# Patient Record
Sex: FEMALE | Race: BLACK OR AFRICAN AMERICAN | Employment: UNEMPLOYED | URBAN - METROPOLITAN AREA
[De-identification: names, ages, dates, MRNs, and addresses within clinical notes are randomized per-mention and may not be internally consistent; named-entity substitution may affect disease eponyms.]

---

## 2018-05-16 LAB
CHLAMYDIA, EXTERNAL: NEGATIVE
HBSAG, EXTERNAL: NEGATIVE
HIV, EXTERNAL: NEGATIVE
N. GONORRHEA, EXTERNAL: NEGATIVE
RUBELLA, EXTERNAL: NORMAL
TYPE, ABO & RH, EXTERNAL: NORMAL

## 2018-09-02 LAB — GRBS, EXTERNAL: NEGATIVE

## 2018-10-09 ENCOUNTER — ANESTHESIA (OUTPATIENT)
Dept: LABOR AND DELIVERY | Age: 33
DRG: 541 | End: 2018-10-09
Payer: MEDICAID

## 2018-10-09 ENCOUNTER — HOSPITAL ENCOUNTER (INPATIENT)
Age: 33
LOS: 3 days | Discharge: HOME | DRG: 541 | End: 2018-10-12
Attending: OBSTETRICS & GYNECOLOGY | Admitting: OBSTETRICS & GYNECOLOGY
Payer: MEDICAID

## 2018-10-09 ENCOUNTER — ANESTHESIA EVENT (OUTPATIENT)
Dept: LABOR AND DELIVERY | Age: 33
DRG: 541 | End: 2018-10-09
Payer: MEDICAID

## 2018-10-09 PROBLEM — O36.8190 DECREASED FETAL MOVEMENT: Status: ACTIVE | Noted: 2018-10-09

## 2018-10-09 PROBLEM — Z33.1 IUP (INTRAUTERINE PREGNANCY), INCIDENTAL: Status: ACTIVE | Noted: 2018-10-09

## 2018-10-09 PROBLEM — O41.00X0 OLIGOHYDRAMNIOS: Status: ACTIVE | Noted: 2018-10-09

## 2018-10-09 LAB
BASOPHILS # BLD: 0 K/UL (ref 0–0.1)
BASOPHILS NFR BLD: 0 % (ref 0–2)
DIFFERENTIAL METHOD BLD: ABNORMAL
EOSINOPHIL # BLD: 0.1 K/UL (ref 0–0.4)
EOSINOPHIL NFR BLD: 1 % (ref 0–5)
ERYTHROCYTE [DISTWIDTH] IN BLOOD BY AUTOMATED COUNT: 16.4 % (ref 11.6–14.5)
HCT VFR BLD AUTO: 29 % (ref 35–45)
HGB BLD-MCNC: 9 G/DL (ref 12–16)
LYMPHOCYTES # BLD: 1.4 K/UL (ref 0.9–3.6)
LYMPHOCYTES NFR BLD: 17 % (ref 21–52)
MCH RBC QN AUTO: 21.2 PG (ref 24–34)
MCHC RBC AUTO-ENTMCNC: 31 G/DL (ref 31–37)
MCV RBC AUTO: 68.4 FL (ref 74–97)
MONOCYTES # BLD: 0.7 K/UL (ref 0.05–1.2)
MONOCYTES NFR BLD: 9 % (ref 3–10)
NEUTS SEG # BLD: 5.9 K/UL (ref 1.8–8)
NEUTS SEG NFR BLD: 73 % (ref 40–73)
PLATELET # BLD AUTO: 284 K/UL (ref 135–420)
PMV BLD AUTO: 10.5 FL (ref 9.2–11.8)
RBC # BLD AUTO: 4.24 M/UL (ref 4.2–5.3)
WBC # BLD AUTO: 8 K/UL (ref 4.6–13.2)

## 2018-10-09 PROCEDURE — 00HU33Z INSERTION OF INFUSION DEVICE INTO SPINAL CANAL, PERCUTANEOUS APPROACH: ICD-10-PCS | Performed by: ANESTHESIOLOGY

## 2018-10-09 PROCEDURE — 65270000029 HC RM PRIVATE

## 2018-10-09 PROCEDURE — 74011250636 HC RX REV CODE- 250/636

## 2018-10-09 PROCEDURE — 86900 BLOOD TYPING SEROLOGIC ABO: CPT | Performed by: ADVANCED PRACTICE MIDWIFE

## 2018-10-09 PROCEDURE — 75410000000 HC DELIVERY VAGINAL/SINGLE

## 2018-10-09 PROCEDURE — 74011250636 HC RX REV CODE- 250/636: Performed by: ADVANCED PRACTICE MIDWIFE

## 2018-10-09 PROCEDURE — 10907ZC DRAINAGE OF AMNIOTIC FLUID, THERAPEUTIC FROM PRODUCTS OF CONCEPTION, VIA NATURAL OR ARTIFICIAL OPENING: ICD-10-PCS | Performed by: OBSTETRICS & GYNECOLOGY

## 2018-10-09 PROCEDURE — 74011000250 HC RX REV CODE- 250

## 2018-10-09 PROCEDURE — 76060000078 HC EPIDURAL ANESTHESIA

## 2018-10-09 PROCEDURE — 77030007879 HC KT SPN EPDRL TELE -B: Performed by: ANESTHESIOLOGY

## 2018-10-09 PROCEDURE — 75410000003 HC RECOV DEL/VAG/CSECN EA 0.5 HR

## 2018-10-09 PROCEDURE — 75410000002 HC LABOR FEE PER 1 HR

## 2018-10-09 PROCEDURE — 59025 FETAL NON-STRESS TEST: CPT

## 2018-10-09 PROCEDURE — 86920 COMPATIBILITY TEST SPIN: CPT | Performed by: ADVANCED PRACTICE MIDWIFE

## 2018-10-09 PROCEDURE — 36415 COLL VENOUS BLD VENIPUNCTURE: CPT | Performed by: ADVANCED PRACTICE MIDWIFE

## 2018-10-09 PROCEDURE — 74011250637 HC RX REV CODE- 250/637: Performed by: ADVANCED PRACTICE MIDWIFE

## 2018-10-09 PROCEDURE — 3E0R3BZ INTRODUCTION OF ANESTHETIC AGENT INTO SPINAL CANAL, PERCUTANEOUS APPROACH: ICD-10-PCS | Performed by: ANESTHESIOLOGY

## 2018-10-09 PROCEDURE — 85025 COMPLETE CBC W/AUTO DIFF WBC: CPT | Performed by: ADVANCED PRACTICE MIDWIFE

## 2018-10-09 PROCEDURE — 3E033VJ INTRODUCTION OF OTHER HORMONE INTO PERIPHERAL VEIN, PERCUTANEOUS APPROACH: ICD-10-PCS | Performed by: OBSTETRICS & GYNECOLOGY

## 2018-10-09 RX ORDER — OXYTOCIN/0.9 % SODIUM CHLORIDE 30/500 ML
0-20 PLASTIC BAG, INJECTION (ML) INTRAVENOUS
Status: DISCONTINUED | OUTPATIENT
Start: 2018-10-09 | End: 2018-10-11

## 2018-10-09 RX ORDER — PROMETHAZINE HYDROCHLORIDE 25 MG/ML
25 INJECTION, SOLUTION INTRAMUSCULAR; INTRAVENOUS
Status: DISCONTINUED | OUTPATIENT
Start: 2018-10-09 | End: 2018-10-12 | Stop reason: HOSPADM

## 2018-10-09 RX ORDER — LIDOCAINE HYDROCHLORIDE AND EPINEPHRINE 15; 5 MG/ML; UG/ML
INJECTION, SOLUTION EPIDURAL AS NEEDED
Status: DISCONTINUED | OUTPATIENT
Start: 2018-10-09 | End: 2018-10-10 | Stop reason: HOSPADM

## 2018-10-09 RX ORDER — AMOXICILLIN 250 MG
1 CAPSULE ORAL
Status: DISCONTINUED | OUTPATIENT
Start: 2018-10-09 | End: 2018-10-12 | Stop reason: HOSPADM

## 2018-10-09 RX ORDER — OXYTOCIN/RINGER'S LACTATE 20/1000 ML
500 PLASTIC BAG, INJECTION (ML) INTRAVENOUS ONCE
Status: ACTIVE | OUTPATIENT
Start: 2018-10-09 | End: 2018-10-10

## 2018-10-09 RX ORDER — FENTANYL CITRATE 50 UG/ML
INJECTION, SOLUTION INTRAMUSCULAR; INTRAVENOUS
Status: COMPLETED
Start: 2018-10-09 | End: 2018-10-09

## 2018-10-09 RX ORDER — NALBUPHINE HYDROCHLORIDE 10 MG/ML
10 INJECTION, SOLUTION INTRAMUSCULAR; INTRAVENOUS; SUBCUTANEOUS
Status: DISCONTINUED | OUTPATIENT
Start: 2018-10-09 | End: 2018-10-10 | Stop reason: HOSPADM

## 2018-10-09 RX ORDER — ACETAMINOPHEN 325 MG/1
650 TABLET ORAL
Status: DISCONTINUED | OUTPATIENT
Start: 2018-10-09 | End: 2018-10-12 | Stop reason: HOSPADM

## 2018-10-09 RX ORDER — FENTANYL/ROPIVACAINE/NS/PF 2MCG/ML-.1
PLASTIC BAG, INJECTION (ML) EPIDURAL
Status: COMPLETED
Start: 2018-10-09 | End: 2018-10-09

## 2018-10-09 RX ORDER — SODIUM CHLORIDE, SODIUM LACTATE, POTASSIUM CHLORIDE, CALCIUM CHLORIDE 600; 310; 30; 20 MG/100ML; MG/100ML; MG/100ML; MG/100ML
125 INJECTION, SOLUTION INTRAVENOUS CONTINUOUS
Status: DISCONTINUED | OUTPATIENT
Start: 2018-10-09 | End: 2018-10-10 | Stop reason: HOSPADM

## 2018-10-09 RX ORDER — TERBUTALINE SULFATE 1 MG/ML
INJECTION SUBCUTANEOUS
Status: COMPLETED
Start: 2018-10-09 | End: 2018-10-09

## 2018-10-09 RX ORDER — FENTANYL CITRATE 50 UG/ML
100 INJECTION, SOLUTION INTRAMUSCULAR; INTRAVENOUS ONCE
Status: DISCONTINUED | OUTPATIENT
Start: 2018-10-09 | End: 2018-10-10 | Stop reason: HOSPADM

## 2018-10-09 RX ORDER — OXYTOCIN/RINGER'S LACTATE 20/1000 ML
PLASTIC BAG, INJECTION (ML) INTRAVENOUS
Status: DISPENSED
Start: 2018-10-09 | End: 2018-10-10

## 2018-10-09 RX ORDER — BUPIVACAINE HYDROCHLORIDE 2.5 MG/ML
INJECTION, SOLUTION EPIDURAL; INFILTRATION; INTRACAUDAL AS NEEDED
Status: DISCONTINUED | OUTPATIENT
Start: 2018-10-09 | End: 2018-10-10 | Stop reason: HOSPADM

## 2018-10-09 RX ORDER — LIDOCAINE HYDROCHLORIDE 10 MG/ML
20 INJECTION, SOLUTION EPIDURAL; INFILTRATION; INTRACAUDAL; PERINEURAL AS NEEDED
Status: DISCONTINUED | OUTPATIENT
Start: 2018-10-09 | End: 2018-10-10 | Stop reason: HOSPADM

## 2018-10-09 RX ORDER — FENTANYL/ROPIVACAINE/NS/PF 2MCG/ML-.1
1-15 PLASTIC BAG, INJECTION (ML) EPIDURAL
Status: DISCONTINUED | OUTPATIENT
Start: 2018-10-09 | End: 2018-10-10 | Stop reason: HOSPADM

## 2018-10-09 RX ORDER — PHENYLEPHRINE HCL IN 0.9% NACL 1 MG/10 ML
80 SYRINGE (ML) INTRAVENOUS AS NEEDED
Status: DISCONTINUED | OUTPATIENT
Start: 2018-10-09 | End: 2018-10-10 | Stop reason: HOSPADM

## 2018-10-09 RX ORDER — FENTANYL CITRATE 50 UG/ML
INJECTION, SOLUTION INTRAMUSCULAR; INTRAVENOUS AS NEEDED
Status: DISCONTINUED | OUTPATIENT
Start: 2018-10-09 | End: 2018-10-10 | Stop reason: HOSPADM

## 2018-10-09 RX ORDER — OXYTOCIN/RINGER'S LACTATE 20/1000 ML
125 PLASTIC BAG, INJECTION (ML) INTRAVENOUS CONTINUOUS
Status: DISCONTINUED | OUTPATIENT
Start: 2018-10-09 | End: 2018-10-10 | Stop reason: HOSPADM

## 2018-10-09 RX ORDER — BUTORPHANOL TARTRATE 1 MG/ML
2 INJECTION INTRAMUSCULAR; INTRAVENOUS
Status: DISCONTINUED | OUTPATIENT
Start: 2018-10-09 | End: 2018-10-10 | Stop reason: HOSPADM

## 2018-10-09 RX ORDER — MINERAL OIL
30 OIL (ML) ORAL AS NEEDED
Status: DISCONTINUED | OUTPATIENT
Start: 2018-10-09 | End: 2018-10-10 | Stop reason: HOSPADM

## 2018-10-09 RX ORDER — ZOLPIDEM TARTRATE 5 MG/1
5 TABLET ORAL
Status: DISCONTINUED | OUTPATIENT
Start: 2018-10-09 | End: 2018-10-12 | Stop reason: HOSPADM

## 2018-10-09 RX ORDER — METHYLERGONOVINE MALEATE 0.2 MG/ML
0.2 INJECTION INTRAVENOUS AS NEEDED
Status: DISCONTINUED | OUTPATIENT
Start: 2018-10-09 | End: 2018-10-10 | Stop reason: HOSPADM

## 2018-10-09 RX ORDER — TERBUTALINE SULFATE 1 MG/ML
0.25 INJECTION SUBCUTANEOUS
Status: DISCONTINUED | OUTPATIENT
Start: 2018-10-09 | End: 2018-10-10 | Stop reason: HOSPADM

## 2018-10-09 RX ORDER — LIDOCAINE HYDROCHLORIDE 10 MG/ML
INJECTION INFILTRATION; PERINEURAL
Status: DISPENSED
Start: 2018-10-09 | End: 2018-10-10

## 2018-10-09 RX ORDER — RANITIDINE 150 MG/1
150 TABLET, FILM COATED ORAL 2 TIMES DAILY
COMMUNITY

## 2018-10-09 RX ORDER — SODIUM CHLORIDE 0.9 % (FLUSH) 0.9 %
5-10 SYRINGE (ML) INJECTION AS NEEDED
Status: DISCONTINUED | OUTPATIENT
Start: 2018-10-09 | End: 2018-10-10 | Stop reason: HOSPADM

## 2018-10-09 RX ORDER — IBUPROFEN 400 MG/1
800 TABLET ORAL EVERY 8 HOURS
Status: DISCONTINUED | OUTPATIENT
Start: 2018-10-09 | End: 2018-10-12 | Stop reason: HOSPADM

## 2018-10-09 RX ORDER — NALOXONE HYDROCHLORIDE 0.4 MG/ML
0.2 INJECTION, SOLUTION INTRAMUSCULAR; INTRAVENOUS; SUBCUTANEOUS AS NEEDED
Status: DISCONTINUED | OUTPATIENT
Start: 2018-10-09 | End: 2018-10-10 | Stop reason: HOSPADM

## 2018-10-09 RX ORDER — SODIUM CHLORIDE 0.9 % (FLUSH) 0.9 %
5-10 SYRINGE (ML) INJECTION EVERY 8 HOURS
Status: DISCONTINUED | OUTPATIENT
Start: 2018-10-09 | End: 2018-10-10 | Stop reason: HOSPADM

## 2018-10-09 RX ORDER — LIDOCAINE HYDROCHLORIDE 10 MG/ML
INJECTION INFILTRATION; PERINEURAL AS NEEDED
Status: DISCONTINUED | OUTPATIENT
Start: 2018-10-09 | End: 2018-10-10 | Stop reason: HOSPADM

## 2018-10-09 RX ADMIN — Medication 2 MILLI-UNITS/MIN: at 16:30

## 2018-10-09 RX ADMIN — FENTANYL CITRATE 100 MCG: 50 INJECTION, SOLUTION INTRAMUSCULAR; INTRAVENOUS at 19:23

## 2018-10-09 RX ADMIN — BUPIVACAINE HYDROCHLORIDE 8 ML: 2.5 INJECTION, SOLUTION EPIDURAL; INFILTRATION; INTRACAUDAL at 19:21

## 2018-10-09 RX ADMIN — TERBUTALINE SULFATE 0.25 MG: 1 INJECTION SUBCUTANEOUS at 22:15

## 2018-10-09 RX ADMIN — LIDOCAINE HYDROCHLORIDE 3 ML: 10 INJECTION INFILTRATION; PERINEURAL at 19:19

## 2018-10-09 RX ADMIN — IBUPROFEN 800 MG: 400 TABLET ORAL at 23:11

## 2018-10-09 RX ADMIN — BUTORPHANOL TARTRATE 2 MG: 1 INJECTION, SOLUTION INTRAMUSCULAR; INTRAVENOUS at 16:49

## 2018-10-09 RX ADMIN — Medication 10 ML/HR: at 19:25

## 2018-10-09 RX ADMIN — LIDOCAINE HYDROCHLORIDE AND EPINEPHRINE 3 ML: 15; 5 INJECTION, SOLUTION EPIDURAL at 19:23

## 2018-10-09 RX ADMIN — ROPIVACAINE HYDROCHLORIDE 10 ML/HR: 10 INJECTION, SOLUTION EPIDURAL at 19:25

## 2018-10-09 RX ADMIN — SODIUM CHLORIDE, SODIUM LACTATE, POTASSIUM CHLORIDE, AND CALCIUM CHLORIDE 125 ML/HR: 600; 310; 30; 20 INJECTION, SOLUTION INTRAVENOUS at 23:13

## 2018-10-09 NOTE — PROGRESS NOTES
1545 Bedside and Verbal shift change report given to ALISTAIR Oden RN (oncoming nurse) by Grant Cao (offgoing nurse). Report included the following information SBAR, Kardex, Procedure Summary, Intake/Output, MAR, Recent Results and Med Rec Status. 1849 Pt request for epidural. IVF bolusing. Supplies gathered. 1904 Dr Monica Nj paged and returned call. He will be here shortly. 1910 Bedside and Verbal shift change report given to OLIVERIO Morales RN (oncoming nurse) by Zainab Fitzgerald RN  (offgoing nurse). Report included the following information SBAR, Kardex, Procedure Summary, Intake/Output, MAR and Recent Results.

## 2018-10-09 NOTE — IP AVS SNAPSHOT
[address] [place] [address] 
[phone] Patient: Dionicio Galarza MRN: [id number] :1985 A check elsie indicates which time of day the medication should be taken. My Medications START taking these medications Instructions Each Dose to Equal  
 Morning Noon Evening Bedtime  
 docusate sodium 100 mg capsule Commonly known as:  Rometta Ohms Your last dose was: Your next dose is: Take 1 Cap by mouth two (2) times daily as needed for Constipation for up to 90 days. 100 mg  
    
   
   
   
  
 ferrous sulfate 325 mg (65 mg iron) EC tablet Commonly known as:  IRON Your last dose was: Your next dose is: Take 1 Tab by mouth Before breakfast and dinner for 90 days. 325 mg  
    
   
   
   
  
 ibuprofen 800 mg tablet Commonly known as:  MOTRIN Your last dose was: Your next dose is: Take 1 Tab by mouth every eight (8) hours. Indications: Pain 800 mg CONTINUE taking these medications Instructions Each Dose to Equal  
 Morning Noon Evening Bedtime PRENATAL #2 PO Your last dose was: Your next dose is: Take 1 Tab by mouth daily. 1 Tab ZANTAC 150 mg tablet Generic drug:  raNITIdine Your last dose was: Your next dose is: Take 150 mg by mouth two (2) times a day. 150 mg Where to Get Your Medications Information on where to get these meds will be given to you by the nurse or doctor. ! Ask your nurse or doctor about these medications  
  docusate sodium 100 mg capsule  
 ferrous sulfate 325 mg (65 mg iron) EC tablet  
 ibuprofen 800 mg tablet

## 2018-10-09 NOTE — PROGRESS NOTES
191 Bedside and Verbal shift change report given to REGULO Morales RN  (oncoming nurse) by Hortensia Almeida RN  (offgoing nurse). Report included the following information SBAR, Kardex, Intake/Output, MAR and Recent Results.  Dr. Buster Acosta at bedside for epidural placement. Procedure explained to include risks and benefits. Verbalizes understanding. All questions answered.  Sitting up on side of bed. Position reviewed. Time out 
 
 Epidural placed 192oading dosed. Fentanyl 100 mcg given by Dr. Buster Acosta  Test dose  Assisted back to bed after epidural placement. Tolerated procedure well. Vital signs stable. Monitors adjusted.  Head to toe assessment complete. Denies headache, blurry vision, floaters or epigastric pain. Pain 3/10 after epidural. VSS.  SVE 4/80/-2.  
 
 Text page recent SVE 4/80/-2 to DARLING Jain.  Patient assisted to right lateral side with peanut ball between legs. Call light within reach.  Variables noted. O2 applied, IV bolus given, left lateral side.  Text page recent SVE 5-6/90/-2 and various variable decelerations to DARLING Jain via Rebecca.  CNM called unit. CNM reviewed strip. Orders to stop pitocin.  Straight cath for 75ml of urine.  CNM called unit, on her way to unit.  Hands and knees. 215 CNM at bedside. 215 Bedpan attempt successful 200ml.  
 
2200 SVE 8/100/+1 
 
2206 Hands and knees. 220 Patient complete. Started pushing. [place] 200 monitored continuously by CNM and RN. 221 Dr. Yana Kohler paged to review strip. 2215 Terbutaline 0.25 mgadministered. 2218 Started pushing on hands and knees. 2219  of viable infant boy. Patient delivered on hands and knees. Umbilical cord cut by FOB under CNM supervision. Infant taken to [facility] to be assessed by Nael.  
 
223 Placenta delivered. 2245 Parker care complete. Clean bed pad, parker pad and ice pack provided. Firm @ U, small [facility].  
 
2308 PP assessment complete. Reports mild headache, no blurry vision no floaters. Pain 6/10 requesting pain medication. VSS.  
 
2311 Motrin 800mg administered. Pain 6/10. 
 
0009 Zofran 4 mg IV administered. 4654 Patient ambulatory to bathroom. Voided 500ml. Parker care complete. Clean parker pad, ice pack and gown provided. Tolerated well. 6387 Patient taken to NICU to see infant son via w/c. 
 
0125 Patient back in room. 0128 Up to bathroom. Voided. Tolerated well. 8621 Tylenol 650 administered. 0200 Patient set up with a breast pump.   
 
0300 Patient sleeping. No needs at this time. Call light within reach. 0410 Infant back in room with patient. 5320 Patient ambulatory to bathroom. Voided. Assisted with parker care. Tolerated well.  
 
0715 Bedside and Verbal shift change report given to Maggie Santos RN (oncoming nurse) by REGULO Pearson RN (offgoing nurse). Report included the following information SBAR, Kardex, Intake/Output, MAR and Recent Results.

## 2018-10-09 NOTE — PROGRESS NOTES
1327  @ 40.3 weeks arrived from office for decreased FM and r/o labor. To bathroom to void, ambulated to bed and connected to EFM. 454 1167 Pt reports active movement at this time, and request to eat. 1415 Pt sitting up in bed eating. 1121 [facility] Desiree Diaz called, reviewed NST and SVE. Will be on unit to perform ultrasound for KENDRA. 1528 DARLING Diaz at bedside doing ultrasound. 1545 Bedside shift change report given to ANNIE Oden RN (oncoming nurse) by Hoa Fink RN 
 (offgoing nurse). Report included the following information SBAR, Kardex and MAR.

## 2018-10-09 NOTE — IP AVS SNAPSHOT
[address] [address] 
[phone] Patient: Bhupinder Piper MRN: [id number] :1985 About your hospitalization You were admitted on:  2018 You last received care in the:  [address] [place] [address] You were discharged on:  2018 Why you were hospitalized Your primary diagnosis was:  Not on File Your diagnoses also included:  Decreased Fetal Movement, Oligohydramnios, Iup (Intrauterine Pregnancy), Incidental, Postpartum Hemorrhage, Delayed (> 24 Hrs), S/P Dilation And Curettage, Spontaneous Vaginal Delivery, Anemia Follow-up Information Follow up With Details Comments Contact Info None   None ([phone]) Patient stated that they have no PCP Burak Craig MD In 6 weeks Call your doctors office to schedule an appt for 6 weeks postpartum check. 2204 [facility] [address] [place] [address] 
[phone] Discharge Orders None A check elsie indicates which time of day the medication should be taken. My Medications START taking these medications Instructions Each Dose to Equal  
 Morning Noon Evening Bedtime  
 docusate sodium 100 mg capsule Commonly known as:  Clemenciacrissy Foster Your last dose was: Your next dose is: Take 1 Cap by mouth two (2) times daily as needed for Constipation for up to 90 days. 100 mg  
    
   
   
   
  
 ferrous sulfate 325 mg (65 mg iron) EC tablet Commonly known as:  IRON Your last dose was: Your next dose is: Take 1 Tab by mouth Before breakfast and dinner for 90 days. 325 mg  
    
   
   
   
  
 ibuprofen 800 mg tablet Commonly known as:  MOTRIN Your last dose was: Your next dose is: Take 1 Tab by mouth every eight (8) hours. Indications: Pain 800 mg CONTINUE taking these medications Instructions Each Dose to Equal  
 Morning Noon Evening Bedtime PRENATAL #2 PO Your last dose was: Your next dose is: Take 1 Tab by mouth daily. 1 Tab ZANTAC 150 mg tablet Generic drug:  raNITIdine Your last dose was: Your next dose is: Take 150 mg by mouth two (2) times a day. 150 mg Where to Get Your Medications Information on where to get these meds will be given to you by the nurse or doctor. ! Ask your nurse or doctor about these medications  
  docusate sodium 100 mg capsule  
 ferrous sulfate 325 mg (65 mg iron) EC tablet  
 ibuprofen 800 mg tablet Discharge Instructions POST DELIVERY DISCHARGE INSTRUCTIONS Name: Joaquim Magallon YOB: 1985 Primary Diagnosis: Active Problems: 
  Postpartum hemorrhage, delayed (> 24 hrs) (10/11/2018) S/P dilation and curettage (10/11/2018) Spontaneous vaginal delivery (10/11/2018) Anemia (10/11/2018) General:  
 
Diet/Diet Restrictions: 
Eight 8-ounce glasses of fluid daily (water, juices); avoid excessive caffeine intake. Meals/snacks as desired which are high in fiber and carbohydrates and low in fat and cholesterol. Physical Activity / Restrictions / Safety:  
 
Avoid heavy lifting, no more than the baby alone (not the baby in the car seat). Avoid intercourse until you are seen at your postpartum visit. No douching or tampon use. Check with obstetrician before starting or resuming an exercise program.    
 
 
Discharge Instructions/Special Treatment/Home Care Needs:  
 
Continue prenatal vitamins. Continue to use squirt bottle with warm water on your perineum after each bathroom use until bleeding stops. Call your doctor for the following:  
 
Fever over 101 degrees by mouth. Vaginal bleeding that soaks two pads per hour for more than one hour.   Red streaks or increased swelling of legs, painful red streaks on your breast. 
If you feel extremely anxious or overwhelmed. If you have thoughts of harming yourself and/or your baby. Pain Management:  
 
Pain Management:  
Take Acetaminophen (Tylenol) or Ibuprofen (Advil, Motrin), as directed for pain. Use a warm Sitz bath 3 times daily to relieve perineal or hemorrhoidal discomfort. For hemorrhoidal discomfort, use Tucks and Anusol cream as needed and directed. Follow-Up Care:  
 
Appointment with MD: Follow-up Appointments Procedures  FOLLOW UP VISIT Appointment in: 6 Weeks 1. Patient is to follow-up for postpartum visit with Dr. Carla Nagel in 6 weeks 1. Patient is to follow-up for postpartum visit with Dr. Carla Nagel in 6 weeks Standing Status:   Standing Number of Occurrences:   1 Order Specific Question:   Appointment in Answer:   6 Weeks Telephone number: [phone] Signed By: Ginny Portillo CNM [email or web address] Activation Thank you for requesting access to rimidi. Please follow the instructions below to securely access and download your online medical record. rimidi allows you to send messages to your doctor, view your test results, renew your prescriptions, schedule appointments, and more. How Do I Sign Up? 1. In your internet browser, go to [email or web address] 
2. Click on the First Time User? Click Here link in the Sign In box. You will be redirect to the New Member Sign Up page. 3. Enter your rimidi Access Code exactly as it appears below. You will not need to use this code after youve completed the sign-up process. If you do not sign up before the expiration date, you must request a new code. rimidi Access Code: [email or web address] Expires: 1/10/2019 11:37 AM (This is the date your rimidi access code will ) 4. Enter the last four digits of your Social Security Number (xxxx) and Date of Birth (mm/dd/yyyy) as indicated and click Submit. You will be taken to the next sign-up page. 5. Create a School Yourself ID. This will be your [employer] Yourself login ID and cannot be changed, so think of one that is secure and easy to remember. 6. Create a [employer] Yourself password. You can change your password at any time. 7. Enter your Password Reset Question and Answer. This can be used at a later time if you forget your password. 8. Enter your e-mail address. You will receive e-mail notification when new information is available in [address] 9. Click Sign Up. You can now view and download portions of your medical record. 10. Click the Download Summary menu link to download a portable copy of your medical information. Additional Information If you have questions, please visit the Frequently Asked Questions section of the [employer] Yourself website at [email or web address] Remember, [employer]self is NOT to be used for urgent needs. For medical emergencies, dial 911. Patient armband removed and shredded Introducing [facility]! [facility] introduces School Yourself patient portal. Now you can access parts of your medical record, email your doctor's office, and request medication refills online. 1. In your internet browser, go to [email or web address] 2. Click on the First Time User? Click Here link in the Sign In box. You will see the New Member Sign Up page. 3. Enter your [employer]self Access Code exactly as it appears below. You will not need to use this code after youve completed the sign-up process. If you do not sign up before the expiration date, you must request a new code. · [employer] Yourself Access Code: [email or web address] Expires: 1/10/2019 11:37 AM 
 
4. Enter the last four digits of your Social Security Number (xxxx) and Date of Birth (mm/dd/yyyy) as indicated and click Submit.  You will be taken to the next sign-up page. 5. Create a Jobzlet ID. This will be your [employer] login ID and cannot be changed, so think of one that is secure and easy to remember. 6. Create a Jobzlet password. You can change your password at any time. 7. Enter your Password Reset Question and Answer. This can be used at a later time if you forget your password. 8. Enter your e-mail address. You will receive e-mail notification when new information is available in [address] 9. Click Sign Up. You can now view and download portions of your medical record. 10. Click the Download Summary menu link to download a portable copy of your medical information. If you have questions, please visit the Frequently Asked Questions section of the [employer] website. Remember, [employer] is NOT to be used for urgent needs. For medical emergencies, dial 911. Now available from your iPhone and Android! Introducing Eriberto Weber As a [employer] patient, I wanted to make you aware of our electronic visit tool called Eriberto Weber. [employer] 24/7 allows you to connect within minutes with a medical provider 24 hours a day, seven days a week via a mobile device or tablet or logging into a secure website from your computer. You can access Eriberto Weber from anywhere in the [place]. A virtual visit might be right for you when you have a simple condition and feel like you just dont want to get out of bed, or cant get away from work for an appointment, when your regular [employer] provider is not available (evenings, weekends or holidays), or when youre out of town and need minor care. Electronic visits cost only $49 and if the [employer] 24/7 provider determines a prescription is needed to treat your condition, one can be electronically transmitted to a nearby pharmacy*. Please take a moment to enroll today if you have not already done so.   The enrollment process is free and takes just a few minutes. To enroll, please download the [address] [place] [address] 24/7 quirino to your tablet or phone, or visit [email or web address] to enroll on your computer. And, as an [address] [place] [address] patient with a blinkbox account, the results of your visits will be scanned into your electronic medical record and your primary care provider will be able to view the scanned results. We urge you to continue to see your regular [address] [place] [address] provider for your ongoing medical care. And while your primary care provider may not be the one available when you seek a [employer] virtual visit, the peace of mind you get from getting a real diagnosis real time can be priceless. For more information on [employer], view our Frequently Asked Questions (FAQs) at [email or web address]. Sincerely, 
 
Jani Chong MD 
Chief Medical Officer [facility] *:  certain medications cannot be prescribed via [employer] Providers Seen During Your Hospitalization Provider Specialty Primary office phone Shiva Moreno MD Obstetrics & Gynecology [phone] Immunizations Administered for This Admission Name Date Tdap  Deferred () Your Primary Care Physician (PCP) Primary Care Physician Office Phone Office Fax NONE ** None ** ** None ** You are allergic to the following Allergen Reactions Pcn (Penicillins) Hives Recent Documentation Height Weight Breastfeeding? BMI OB Status Smoking Status 1.651 m 69.9 kg Unknown 25.63 kg/m2 Recent pregnancy Never Smoker Emergency Contacts Name Discharge Info Relation Home Work Mobile [place] DISCHARGE CAREGIVER [3] Boyfriend [17]   [phone] Patient Belongings  The following personal items are in your possession at time of discharge: 
  Dental Appliances: Retainer(s)         Home Medications: None   Jewelry: Body Piercing  Clothing: At bedside    Other Valuables: None Please provide this summary of care documentation to your next provider. Signatures-by signing, you are acknowledging that this After Visit Summary has been reviewed with you and you have received a copy. Patient Signature:  ____________________________________________________________ Date:  ____________________________________________________________  
  
Oddis Jay Provider Signature:  ____________________________________________________________ Date:  ____________________________________________________________

## 2018-10-09 NOTE — ANESTHESIA PROCEDURE NOTES
Epidural Block Start time: 10/9/2018 7:15 PM 
End time: 10/9/2018 7:29 PM 
Performed by: Allison Berman Authorized by: Allison Berman  
 
Pre-Procedure Indication: labor epidural   
Preanesthetic Checklist: patient identified, risks and benefits discussed, anesthesia consent, patient being monitored, timeout performed and anesthesia consent Epidural:  
Patient position:  Seated Prep region:  Lumbar Prep: Chlorhexidine Location:  L3-4 Needle and Epidural Catheter:  
Needle Type:  Tuohy Needle Gauge:  17 G Injection Technique:  Loss of resistance using air Attempts:  1 Catheter Size:  20 G Catheter at Skin Depth (cm):  13 Events: no blood with aspiration, no cerebrospinal fluid with aspiration, no paresthesia and negative aspiration test   
Test Dose:  Negative and lidocaine 1.5% w/ epi Assessment:  
Catheter Secured:  Tegaderm and tape Insertion:  Uncomplicated Patient tolerance:  Patient tolerated the procedure well with no immediate complications

## 2018-10-09 NOTE — H&P
History & Physical 
 
Name: Marisabel Santos MRN: [id number]  SSN: [id number] YOB: 1985  Age: 34 y.o. Sex: female Subjective:  
 
Estimated Date of Delivery: 10/6/18 OB History  Para Term  AB Living 3 1  1 1 1 SAB TAB Ectopic Molar Multiple Live Births 1 Ms. Sylvie Solis   is admitted with pregnancy at 40 weeks 3 days for induction of labor. Prenatal course was complicated by oligohydramnios, KENDRA 4.83 here in L&D. Was initially sent from our office for decreased fetal movement and nonreactive NST. Please see prenatal records for details. PMHx, SHx, Family Hx, ROS unchanged from prenatal H&P. Group B Strep was negative. Objective:  
 
Vitals: 
Vitals:  
 10/09/18 1336 10/09/18 1431 BP:  140/86 Pulse:  90 Resp:  18 Temp:  98.1 °F (36.7 °C) Weight: 69.9 kg (154 lb) Height: 5' 5\" (1.651 m) Cervical Exam 
Dilation (cm): 3 Eff: 70 % Station: -2 Vaginal exam done by? : Maria Guadalupe Pierce RNC Patient Vitals for the past 4 hrs: Mode Fetal Heart Rate Fetal Activity Variability Decelerations Accelerations FHR Interpretation RN Reviewed Strip? Provider who reviewed strip? Non Stress Test  
10/09/18 1455 External 115 Present 6-25 BPM None Yes - - - -  
10/09/18 1430 External 115 - 6-25 BPM None Yes - - DARLING FIELDS -  
10/09/18 1400 External 115 Present 6-25 BPM None Yes Category I Yes - Reactive Physical Exam: 
Cervical Exam 
Dilation (cm): 3 Eff: 70 % Station: -2 Vaginal exam done by? : Maria Guadalupe Dalyron RNC Blood pressure 140/86, pulse 90, temperature 98.1 °F (36.7 °C), resp. rate 18, height 5' 5\" (1.651 m), weight 69.9 kg (154 lb). Temp (24hrs), Av.1 °F (36.7 °C), Min:98.1 °F (36.7 °C), Max:98.1 °F (36.7 °C) Pelvic: Cervix 3, Effaced: 70% Station:  -2 Data Review: No results found for this or any previous visit (from the past 24 hour(s)). Monitor:  Reactivity:present Variability:present Baseline:within normal limits Assessment/Plan:  
 
Plan:  Admit for induction of labor, Risks/Benefits explained and Consent Signed. Signed By:  Janneth Caceres CNM October 9, 2018

## 2018-10-09 NOTE — ANESTHESIA PREPROCEDURE EVALUATION
Anesthetic History No history of anesthetic complications Review of Systems / Medical History Patient summary reviewed, nursing notes reviewed and pertinent labs reviewed Pulmonary Within defined limits Neuro/Psych Within defined limits Cardiovascular Within defined limits Exercise tolerance: >4 METS 
  
GI/Hepatic/Renal 
Within defined limits Endo/Other Within defined limits Other Findings Physical Exam 
 
Airway Mallampati: II 
TM Distance: 4 - 6 cm Neck ROM: normal range of motion Mouth opening: Normal 
 
 Cardiovascular Regular rate and rhythm,  S1 and S2 normal,  no murmur, click, rub, or gallop Dental 
No notable dental hx Pulmonary Breath sounds clear to auscultation Abdominal 
GI exam deferred Other Findings Anesthetic Plan ASA: 2 Anesthesia type: epidural 
 
 
 
 
 
Anesthetic plan and risks discussed with: Patient

## 2018-10-10 LAB
HCT VFR BLD AUTO: 25.1 % (ref 35–45)
HGB BLD-MCNC: 7.7 G/DL (ref 12–16)

## 2018-10-10 PROCEDURE — 36415 COLL VENOUS BLD VENIPUNCTURE: CPT | Performed by: ADVANCED PRACTICE MIDWIFE

## 2018-10-10 PROCEDURE — 74011250637 HC RX REV CODE- 250/637: Performed by: ADVANCED PRACTICE MIDWIFE

## 2018-10-10 PROCEDURE — 65270000029 HC RM PRIVATE

## 2018-10-10 PROCEDURE — 88307 TISSUE EXAM BY PATHOLOGIST: CPT | Performed by: OBSTETRICS & GYNECOLOGY

## 2018-10-10 PROCEDURE — 74011250636 HC RX REV CODE- 250/636: Performed by: ADVANCED PRACTICE MIDWIFE

## 2018-10-10 PROCEDURE — 85014 HEMATOCRIT: CPT | Performed by: ADVANCED PRACTICE MIDWIFE

## 2018-10-10 PROCEDURE — 85018 HEMOGLOBIN: CPT | Performed by: ADVANCED PRACTICE MIDWIFE

## 2018-10-10 RX ORDER — METHYLERGONOVINE MALEATE 0.2 MG/ML
0.2 INJECTION INTRAVENOUS ONCE
Status: COMPLETED | OUTPATIENT
Start: 2018-10-11 | End: 2018-10-11

## 2018-10-10 RX ORDER — ONDANSETRON 2 MG/ML
4 INJECTION INTRAMUSCULAR; INTRAVENOUS
Status: DISCONTINUED | OUTPATIENT
Start: 2018-10-10 | End: 2018-10-12 | Stop reason: HOSPADM

## 2018-10-10 RX ORDER — OXYTOCIN/RINGER'S LACTATE 20/1000 ML
999 PLASTIC BAG, INJECTION (ML) INTRAVENOUS ONCE
Status: DISCONTINUED | OUTPATIENT
Start: 2018-10-11 | End: 2018-10-11

## 2018-10-10 RX ORDER — OXYTOCIN/RINGER'S LACTATE 20/1000 ML
125 PLASTIC BAG, INJECTION (ML) INTRAVENOUS CONTINUOUS
Status: DISCONTINUED | OUTPATIENT
Start: 2018-10-11 | End: 2018-10-12 | Stop reason: HOSPADM

## 2018-10-10 RX ADMIN — ACETAMINOPHEN 650 MG: 325 TABLET ORAL at 01:37

## 2018-10-10 RX ADMIN — ACETAMINOPHEN 650 MG: 325 TABLET ORAL at 15:58

## 2018-10-10 RX ADMIN — ACETAMINOPHEN 650 MG: 325 TABLET ORAL at 20:59

## 2018-10-10 RX ADMIN — IBUPROFEN 800 MG: 400 TABLET ORAL at 10:44

## 2018-10-10 RX ADMIN — ONDANSETRON 4 MG: 2 INJECTION INTRAMUSCULAR; INTRAVENOUS at 00:09

## 2018-10-10 NOTE — LACTATION NOTE
Per mom, infant latching and nursing well. Mom educated on breastfeeding basics--hunger cues, feeding on demand, waking baby if baby sleeps too long between feeds, importance of skin to skin, positioning and latching, risk of pacifier use and supplemental feedings, and importance of rooming in--and use of log sheet. Mom also educated on benefits of breastfeeding for herself and baby. Mom verbalized understanding. No questions at this time. 1510 Infant latched and nursed well for about 2 minutes. Discussed possible tongue tie. Hand expression education completed with mom and handout with spoon given for reinforcement. Showed how to feed infant expressed colostrum with spoon. Mom verbalized understanding and no questions at this time.

## 2018-10-10 NOTE — PROGRESS NOTES
Progress Note Patient: Margaret Heredia MRN: [id number]  SSN: [id number] YOB: 1985  Age: 34 y.o. Sex: female Subjective:  
 
Postpartum Day: 1 Vaginal Delivery The patient is without complaints. The patient is ambulating well. The patient  tolerating a normal diet. The baby is well. Objective:  
  
Patient Vitals for the past 8 hrs: 
 BP Temp Pulse Resp 10/10/18 0652 121/78 98.1 °F (36.7 °C) 68 16  
10/10/18 0138 141/79 98.3 °F (36.8 °C) 74 18 LABS: Recent Results (from the past 24 hour(s)) CBC WITH AUTOMATED DIFF Collection Time: 10/09/18  4:45 PM  
Result Value Ref Range WBC 8.0 4.6 - 13.2 K/uL  
 RBC 4.24 4.20 - 5.30 M/uL HGB 9.0 (L) 12.0 - 16.0 g/dL HCT 29.0 (L) 35.0 - 45.0 % MCV 68.4 (L) 74.0 - 97.0 FL  
 MCH 21.2 (L) 24.0 - 34.0 PG  
 MCHC 31.0 31.0 - 37.0 g/dL  
 RDW 16.4 (H) 11.6 - 14.5 % PLATELET 961 887 - 324 K/uL MPV 10.5 9.2 - 11.8 FL  
 NEUTROPHILS 73 40 - 73 % LYMPHOCYTES 17 (L) 21 - 52 % MONOCYTES 9 3 - 10 % EOSINOPHILS 1 0 - 5 % BASOPHILS 0 0 - 2 %  
 ABS. NEUTROPHILS 5.9 1.8 - 8.0 K/UL  
 ABS. LYMPHOCYTES 1.4 0.9 - 3.6 K/UL  
 ABS. MONOCYTES 0.7 0.05 - 1.2 K/UL  
 ABS. EOSINOPHILS 0.1 0.0 - 0.4 K/UL  
 ABS. BASOPHILS 0.0 0.0 - 0.1 K/UL  
 DF AUTOMATED    
TYPE & SCREEN Collection Time: 10/09/18  4:45 PM  
Result Value Ref Range Crossmatch Expiration 10/12/2018 ABO/Rh(D) A POSITIVE Antibody screen NEG   
HEMOGLOBIN Collection Time: 10/10/18  5:30 AM  
Result Value Ref Range HGB 7.7 (L) 12.0 - 16.0 g/dL HEMATOCRIT Collection Time: 10/10/18  5:30 AM  
Result Value Ref Range HCT 25.1 (L) 35.0 - 45.0 % Lab Results Component Value Date/Time HGB 7.7 (L) 10/10/2018 05:30 AM  
 
Lab Results Component Value Date/Time HCT 25.1 (L) 10/10/2018 05:30 AM  
  
Lochia:  appropriate Uterine Fundus:   firm, -1 Lab/Data Review: All lab results for the last 24 hours reviewed. Assessment: Status post: Doing well postpartum vaginal delivery day 1. Plan:  
 
Continue day 1 post-vaginal delivery orders. Postpartum care discussed including diet, ambulation, and actvitiy restrictions. Signed By: Hadley Richter CNM October 10, 2018

## 2018-10-10 NOTE — ANESTHESIA POSTPROCEDURE EVALUATION
10/10/2018 
2:14 PM 
 
Laboring Epidural Follow-up Note Referring physician: Ludivina Conley MD  
Patient status post vaginal delivery with labor epidural 
 
Visit Vitals  /78  Pulse 68  Temp 36.7 °C (98.1 °F)  Resp 16  
 Ht 5' 5\" (1.651 m)  Wt 69.9 kg (154 lb)  SpO2 100%  Breastfeeding No  
 BMI 25.63 kg/m2 Epidural removed by L&D staff No sedation, pruritis noted. Adequate analgesia. No obvious anesthesia complications Jo-Ann Sickle, CRNA

## 2018-10-10 NOTE — PROGRESS NOTES
Vaginal Delivery Procedure Note Name: Desmond Andrews Medical Record Number: [id number] YOB: 1985 Today's Date: 2018 Procedure: VAGINAL DELIVERY Anesthesia: yes Type - 1% xylocaine local:no  Epidural: yes Extra Procedure Details:   
  
Estimated Blood Loss: 150 ccs Fetal Description:  male Anterior shoulder: left Episiotomy: no  
Tear: no 
Degree: N/A degree Cord Blood Results:  
Information for the patient's :  Maggie Gaspar [[id number]] No components found for: ABG Apgars: 8/9 Birth Information:  
Information for the patient's :  Maggie Gaspar [[id number]] Placenta: delivered spontaneously within 20 minutes, appears intact, 3 vessel cord, marginal cord insertion Specimens: Placenta was not sent Complications:  none Birth Weight: pending Mother's Condition: good Baby's Condition: transferred to NICU, tachypnea I was present for the delivery. Signed: Mahogany Macario CNM 2018

## 2018-10-10 NOTE — PROGRESS NOTES
0700 Bedside and Verbal shift change report given to Suman Parker RN 
 (oncoming nurse) by REGULO Morales RN (offgoing nurse). Report included the following information SBAR, Intake/Output, MAR and Recent Results. Whiteboard updated. Pt in bed feeding  at this time. Pt assessment completed (see flowsheet) 1900 Bedside and Verbal shift change report given to A Sample RN (oncoming nurse) by Suman Parker RN 
 (offgoing nurse). Report included the following information SBAR, Intake/Output, MAR and Recent Results.

## 2018-10-11 ENCOUNTER — ANESTHESIA EVENT (OUTPATIENT)
Dept: SURGERY | Age: 33
DRG: 541 | End: 2018-10-11
Payer: MEDICAID

## 2018-10-11 ENCOUNTER — ANESTHESIA (OUTPATIENT)
Dept: SURGERY | Age: 33
DRG: 541 | End: 2018-10-11
Payer: MEDICAID

## 2018-10-11 PROBLEM — Z33.1 IUP (INTRAUTERINE PREGNANCY), INCIDENTAL: Status: RESOLVED | Noted: 2018-10-09 | Resolved: 2018-10-11

## 2018-10-11 PROBLEM — O36.8190 DECREASED FETAL MOVEMENT: Status: RESOLVED | Noted: 2018-10-09 | Resolved: 2018-10-11

## 2018-10-11 PROBLEM — D64.9 ANEMIA: Status: ACTIVE | Noted: 2018-10-11

## 2018-10-11 PROBLEM — Z98.890 S/P DILATION AND CURETTAGE: Status: ACTIVE | Noted: 2018-10-11

## 2018-10-11 PROBLEM — O41.00X0 OLIGOHYDRAMNIOS: Status: RESOLVED | Noted: 2018-10-09 | Resolved: 2018-10-11

## 2018-10-11 LAB
ALBUMIN SERPL-MCNC: 2.7 G/DL (ref 3.4–5)
ALBUMIN/GLOB SERPL: 0.7 {RATIO} (ref 0.8–1.7)
ALP SERPL-CCNC: 137 U/L (ref 45–117)
ALT SERPL-CCNC: 15 U/L (ref 13–56)
ANION GAP SERPL CALC-SCNC: 9 MMOL/L (ref 3–18)
AST SERPL-CCNC: 23 U/L (ref 15–37)
BASOPHILS # BLD: 0 K/UL (ref 0–0.1)
BASOPHILS NFR BLD: 0 % (ref 0–2)
BILIRUB SERPL-MCNC: 0.7 MG/DL (ref 0.2–1)
BUN SERPL-MCNC: 6 MG/DL (ref 7–18)
BUN/CREAT SERPL: 7 (ref 12–20)
CALCIUM SERPL-MCNC: 8.8 MG/DL (ref 8.5–10.1)
CHLORIDE SERPL-SCNC: 105 MMOL/L (ref 100–108)
CO2 SERPL-SCNC: 24 MMOL/L (ref 21–32)
CREAT SERPL-MCNC: 0.91 MG/DL (ref 0.6–1.3)
DIFFERENTIAL METHOD BLD: ABNORMAL
EOSINOPHIL # BLD: 0 K/UL (ref 0–0.4)
EOSINOPHIL NFR BLD: 0 % (ref 0–5)
ERYTHROCYTE [DISTWIDTH] IN BLOOD BY AUTOMATED COUNT: 16.6 % (ref 11.6–14.5)
ERYTHROCYTE [DISTWIDTH] IN BLOOD BY AUTOMATED COUNT: 18.7 % (ref 11.6–14.5)
GLOBULIN SER CALC-MCNC: 3.9 G/DL (ref 2–4)
GLUCOSE SERPL-MCNC: 89 MG/DL (ref 74–99)
HCT VFR BLD AUTO: 20.9 % (ref 35–45)
HCT VFR BLD AUTO: 31.5 % (ref 35–45)
HGB BLD-MCNC: 10.2 G/DL (ref 12–16)
HGB BLD-MCNC: 6.6 G/DL (ref 12–16)
LDH SERPL L TO P-CCNC: 288 U/L (ref 81–234)
LYMPHOCYTES # BLD: 0.8 K/UL (ref 0.9–3.6)
LYMPHOCYTES NFR BLD: 6 % (ref 21–52)
MCH RBC QN AUTO: 21.6 PG (ref 24–34)
MCH RBC QN AUTO: 23.3 PG (ref 24–34)
MCHC RBC AUTO-ENTMCNC: 31.6 G/DL (ref 31–37)
MCHC RBC AUTO-ENTMCNC: 32.4 G/DL (ref 31–37)
MCV RBC AUTO: 68.3 FL (ref 74–97)
MCV RBC AUTO: 71.9 FL (ref 74–97)
MONOCYTES # BLD: 0.3 K/UL (ref 0.05–1.2)
MONOCYTES NFR BLD: 2 % (ref 3–10)
NEUTS SEG # BLD: 12.7 K/UL (ref 1.8–8)
NEUTS SEG NFR BLD: 92 % (ref 40–73)
PLATELET # BLD AUTO: 225 K/UL (ref 135–420)
PLATELET # BLD AUTO: 240 K/UL (ref 135–420)
PMV BLD AUTO: 10.1 FL (ref 9.2–11.8)
PMV BLD AUTO: 9.9 FL (ref 9.2–11.8)
POTASSIUM SERPL-SCNC: 4.3 MMOL/L (ref 3.5–5.5)
PROT SERPL-MCNC: 6.6 G/DL (ref 6.4–8.2)
RBC # BLD AUTO: 3.06 M/UL (ref 4.2–5.3)
RBC # BLD AUTO: 4.38 M/UL (ref 4.2–5.3)
SODIUM SERPL-SCNC: 138 MMOL/L (ref 136–145)
URATE SERPL-MCNC: 5.3 MG/DL (ref 2.6–7.2)
WBC # BLD AUTO: 13.8 K/UL (ref 4.6–13.2)
WBC # BLD AUTO: 9.6 K/UL (ref 4.6–13.2)

## 2018-10-11 PROCEDURE — 80053 COMPREHEN METABOLIC PANEL: CPT | Performed by: OBSTETRICS & GYNECOLOGY

## 2018-10-11 PROCEDURE — 74011250636 HC RX REV CODE- 250/636

## 2018-10-11 PROCEDURE — 76060000032 HC ANESTHESIA 0.5 TO 1 HR: Performed by: OBSTETRICS & GYNECOLOGY

## 2018-10-11 PROCEDURE — 85027 COMPLETE CBC AUTOMATED: CPT | Performed by: OBSTETRICS & GYNECOLOGY

## 2018-10-11 PROCEDURE — 77030008683 HC TU ET CUF COVD -A: Performed by: ANESTHESIOLOGY

## 2018-10-11 PROCEDURE — 77030018836 HC SOL IRR NACL ICUM -A: Performed by: OBSTETRICS & GYNECOLOGY

## 2018-10-11 PROCEDURE — 65270000029 HC RM PRIVATE

## 2018-10-11 PROCEDURE — 88305 TISSUE EXAM BY PATHOLOGIST: CPT | Performed by: OBSTETRICS & GYNECOLOGY

## 2018-10-11 PROCEDURE — 77030034849

## 2018-10-11 PROCEDURE — 74011250636 HC RX REV CODE- 250/636: Performed by: OBSTETRICS & GYNECOLOGY

## 2018-10-11 PROCEDURE — 74011250637 HC RX REV CODE- 250/637: Performed by: OBSTETRICS & GYNECOLOGY

## 2018-10-11 PROCEDURE — 74011000250 HC RX REV CODE- 250

## 2018-10-11 PROCEDURE — 76210000017 HC OR PH I REC 1.5 TO 2 HR: Performed by: OBSTETRICS & GYNECOLOGY

## 2018-10-11 PROCEDURE — 76010000138 HC OR TIME 0.5 TO 1 HR: Performed by: OBSTETRICS & GYNECOLOGY

## 2018-10-11 PROCEDURE — 83615 LACTATE (LD) (LDH) ENZYME: CPT | Performed by: OBSTETRICS & GYNECOLOGY

## 2018-10-11 PROCEDURE — 77030032490 HC SLV COMPR SCD KNE COVD -B: Performed by: OBSTETRICS & GYNECOLOGY

## 2018-10-11 PROCEDURE — 85025 COMPLETE CBC W/AUTO DIFF WBC: CPT | Performed by: OBSTETRICS & GYNECOLOGY

## 2018-10-11 PROCEDURE — 74011250637 HC RX REV CODE- 250/637: Performed by: ADVANCED PRACTICE MIDWIFE

## 2018-10-11 PROCEDURE — 77030008477 HC STYL SATN SLP COVD -A: Performed by: ANESTHESIOLOGY

## 2018-10-11 PROCEDURE — 10D17ZZ EXTRACTION OF PRODUCTS OF CONCEPTION, RETAINED, VIA NATURAL OR ARTIFICIAL OPENING: ICD-10-PCS | Performed by: OBSTETRICS & GYNECOLOGY

## 2018-10-11 PROCEDURE — 77030006643: Performed by: ANESTHESIOLOGY

## 2018-10-11 PROCEDURE — 77030011210: Performed by: OBSTETRICS & GYNECOLOGY

## 2018-10-11 PROCEDURE — 36415 COLL VENOUS BLD VENIPUNCTURE: CPT | Performed by: OBSTETRICS & GYNECOLOGY

## 2018-10-11 PROCEDURE — 77030031139 HC SUT VCRL2 J&J -A: Performed by: OBSTETRICS & GYNECOLOGY

## 2018-10-11 PROCEDURE — P9016 RBC LEUKOCYTES REDUCED: HCPCS | Performed by: ADVANCED PRACTICE MIDWIFE

## 2018-10-11 PROCEDURE — 77030013079 HC BLNKT BAIR HGGR 3M -A: Performed by: ANESTHESIOLOGY

## 2018-10-11 PROCEDURE — 77030008578 HC TBNG UTER SUC BUSS -A: Performed by: OBSTETRICS & GYNECOLOGY

## 2018-10-11 PROCEDURE — 84550 ASSAY OF BLOOD/URIC ACID: CPT | Performed by: OBSTETRICS & GYNECOLOGY

## 2018-10-11 PROCEDURE — 74011250636 HC RX REV CODE- 250/636: Performed by: ADVANCED PRACTICE MIDWIFE

## 2018-10-11 PROCEDURE — 74011000250 HC RX REV CODE- 250: Performed by: OBSTETRICS & GYNECOLOGY

## 2018-10-11 RX ORDER — ROCURONIUM BROMIDE 10 MG/ML
INJECTION, SOLUTION INTRAVENOUS AS NEEDED
Status: DISCONTINUED | OUTPATIENT
Start: 2018-10-11 | End: 2018-10-11 | Stop reason: HOSPADM

## 2018-10-11 RX ORDER — DEXTROSE 50 % IN WATER (D50W) INTRAVENOUS SYRINGE
25-50 AS NEEDED
Status: DISCONTINUED | OUTPATIENT
Start: 2018-10-11 | End: 2018-10-11 | Stop reason: SDUPTHER

## 2018-10-11 RX ORDER — ONDANSETRON 2 MG/ML
4 INJECTION INTRAMUSCULAR; INTRAVENOUS ONCE
Status: DISCONTINUED | OUTPATIENT
Start: 2018-10-11 | End: 2018-10-11 | Stop reason: HOSPADM

## 2018-10-11 RX ORDER — DIPHENHYDRAMINE HYDROCHLORIDE 50 MG/ML
12.5 INJECTION, SOLUTION INTRAMUSCULAR; INTRAVENOUS
Status: DISCONTINUED | OUTPATIENT
Start: 2018-10-11 | End: 2018-10-11 | Stop reason: HOSPADM

## 2018-10-11 RX ORDER — SODIUM CHLORIDE, SODIUM LACTATE, POTASSIUM CHLORIDE, CALCIUM CHLORIDE 600; 310; 30; 20 MG/100ML; MG/100ML; MG/100ML; MG/100ML
150 INJECTION, SOLUTION INTRAVENOUS CONTINUOUS
Status: DISCONTINUED | OUTPATIENT
Start: 2018-10-11 | End: 2018-10-11 | Stop reason: SDUPTHER

## 2018-10-11 RX ORDER — SUCCINYLCHOLINE CHLORIDE 20 MG/ML
INJECTION INTRAMUSCULAR; INTRAVENOUS AS NEEDED
Status: DISCONTINUED | OUTPATIENT
Start: 2018-10-11 | End: 2018-10-11 | Stop reason: HOSPADM

## 2018-10-11 RX ORDER — OXYCODONE HYDROCHLORIDE 5 MG/1
5 TABLET ORAL ONCE
Status: DISCONTINUED | OUTPATIENT
Start: 2018-10-11 | End: 2018-10-11 | Stop reason: HOSPADM

## 2018-10-11 RX ORDER — DEXAMETHASONE SODIUM PHOSPHATE 4 MG/ML
INJECTION, SOLUTION INTRA-ARTICULAR; INTRALESIONAL; INTRAMUSCULAR; INTRAVENOUS; SOFT TISSUE AS NEEDED
Status: DISCONTINUED | OUTPATIENT
Start: 2018-10-11 | End: 2018-10-11 | Stop reason: HOSPADM

## 2018-10-11 RX ORDER — GENTAMICIN SULFATE 60 MG/50ML
120 INJECTION, SOLUTION INTRAVENOUS ONCE
Status: COMPLETED | OUTPATIENT
Start: 2018-10-11 | End: 2018-10-11

## 2018-10-11 RX ORDER — ALBUTEROL SULFATE 0.83 MG/ML
2.5 SOLUTION RESPIRATORY (INHALATION) AS NEEDED
Status: DISCONTINUED | OUTPATIENT
Start: 2018-10-11 | End: 2018-10-11 | Stop reason: HOSPADM

## 2018-10-11 RX ORDER — NALOXONE HYDROCHLORIDE 0.4 MG/ML
0.1 INJECTION, SOLUTION INTRAMUSCULAR; INTRAVENOUS; SUBCUTANEOUS AS NEEDED
Status: DISCONTINUED | OUTPATIENT
Start: 2018-10-11 | End: 2018-10-11 | Stop reason: SDUPTHER

## 2018-10-11 RX ORDER — FENTANYL CITRATE 50 UG/ML
INJECTION, SOLUTION INTRAMUSCULAR; INTRAVENOUS AS NEEDED
Status: DISCONTINUED | OUTPATIENT
Start: 2018-10-11 | End: 2018-10-11 | Stop reason: HOSPADM

## 2018-10-11 RX ORDER — ALBUTEROL SULFATE 0.83 MG/ML
2.5 SOLUTION RESPIRATORY (INHALATION) AS NEEDED
Status: DISCONTINUED | OUTPATIENT
Start: 2018-10-11 | End: 2018-10-11 | Stop reason: SDUPTHER

## 2018-10-11 RX ORDER — MAGNESIUM SULFATE 100 %
4 CRYSTALS MISCELLANEOUS AS NEEDED
Status: DISCONTINUED | OUTPATIENT
Start: 2018-10-11 | End: 2018-10-11 | Stop reason: SDUPTHER

## 2018-10-11 RX ORDER — CLINDAMYCIN PHOSPHATE 900 MG/50ML
900 INJECTION, SOLUTION INTRAVENOUS
Status: COMPLETED | OUTPATIENT
Start: 2018-10-11 | End: 2018-10-11

## 2018-10-11 RX ORDER — SODIUM CHLORIDE, SODIUM LACTATE, POTASSIUM CHLORIDE, CALCIUM CHLORIDE 600; 310; 30; 20 MG/100ML; MG/100ML; MG/100ML; MG/100ML
INJECTION, SOLUTION INTRAVENOUS
Status: DISCONTINUED | OUTPATIENT
Start: 2018-10-11 | End: 2018-10-11 | Stop reason: HOSPADM

## 2018-10-11 RX ORDER — MIDAZOLAM HYDROCHLORIDE 1 MG/ML
INJECTION, SOLUTION INTRAMUSCULAR; INTRAVENOUS AS NEEDED
Status: DISCONTINUED | OUTPATIENT
Start: 2018-10-11 | End: 2018-10-11 | Stop reason: HOSPADM

## 2018-10-11 RX ORDER — METOCLOPRAMIDE HYDROCHLORIDE 5 MG/ML
INJECTION INTRAMUSCULAR; INTRAVENOUS AS NEEDED
Status: DISCONTINUED | OUTPATIENT
Start: 2018-10-11 | End: 2018-10-11 | Stop reason: HOSPADM

## 2018-10-11 RX ORDER — ACETAMINOPHEN 10 MG/ML
1000 INJECTION, SOLUTION INTRAVENOUS ONCE
Status: COMPLETED | OUTPATIENT
Start: 2018-10-11 | End: 2018-10-11

## 2018-10-11 RX ORDER — CARBOPROST TROMETHAMINE 250 UG/ML
250 INJECTION, SOLUTION INTRAMUSCULAR ONCE
Status: COMPLETED | OUTPATIENT
Start: 2018-10-11 | End: 2018-10-11

## 2018-10-11 RX ORDER — ONDANSETRON 2 MG/ML
4 INJECTION INTRAMUSCULAR; INTRAVENOUS ONCE
Status: DISCONTINUED | OUTPATIENT
Start: 2018-10-11 | End: 2018-10-11 | Stop reason: SDUPTHER

## 2018-10-11 RX ORDER — SODIUM CHLORIDE 0.9 % (FLUSH) 0.9 %
5-10 SYRINGE (ML) INJECTION AS NEEDED
Status: DISCONTINUED | OUTPATIENT
Start: 2018-10-11 | End: 2018-10-11 | Stop reason: SDUPTHER

## 2018-10-11 RX ORDER — SODIUM CHLORIDE, SODIUM LACTATE, POTASSIUM CHLORIDE, CALCIUM CHLORIDE 600; 310; 30; 20 MG/100ML; MG/100ML; MG/100ML; MG/100ML
150 INJECTION, SOLUTION INTRAVENOUS CONTINUOUS
Status: DISCONTINUED | OUTPATIENT
Start: 2018-10-11 | End: 2018-10-11 | Stop reason: HOSPADM

## 2018-10-11 RX ORDER — ONDANSETRON 2 MG/ML
INJECTION INTRAMUSCULAR; INTRAVENOUS AS NEEDED
Status: DISCONTINUED | OUTPATIENT
Start: 2018-10-11 | End: 2018-10-11 | Stop reason: HOSPADM

## 2018-10-11 RX ORDER — DEXTROSE 50 % IN WATER (D50W) INTRAVENOUS SYRINGE
25-50 AS NEEDED
Status: DISCONTINUED | OUTPATIENT
Start: 2018-10-11 | End: 2018-10-11 | Stop reason: HOSPADM

## 2018-10-11 RX ORDER — FENTANYL CITRATE 50 UG/ML
25 INJECTION, SOLUTION INTRAMUSCULAR; INTRAVENOUS AS NEEDED
Status: DISCONTINUED | OUTPATIENT
Start: 2018-10-11 | End: 2018-10-11 | Stop reason: HOSPADM

## 2018-10-11 RX ORDER — NALOXONE HYDROCHLORIDE 0.4 MG/ML
0.1 INJECTION, SOLUTION INTRAMUSCULAR; INTRAVENOUS; SUBCUTANEOUS AS NEEDED
Status: DISCONTINUED | OUTPATIENT
Start: 2018-10-11 | End: 2018-10-11 | Stop reason: HOSPADM

## 2018-10-11 RX ORDER — FENTANYL CITRATE 50 UG/ML
25 INJECTION, SOLUTION INTRAMUSCULAR; INTRAVENOUS AS NEEDED
Status: DISCONTINUED | OUTPATIENT
Start: 2018-10-11 | End: 2018-10-11 | Stop reason: SDUPTHER

## 2018-10-11 RX ORDER — DIPHENHYDRAMINE HYDROCHLORIDE 50 MG/ML
12.5 INJECTION, SOLUTION INTRAMUSCULAR; INTRAVENOUS
Status: DISCONTINUED | OUTPATIENT
Start: 2018-10-11 | End: 2018-10-11 | Stop reason: SDUPTHER

## 2018-10-11 RX ORDER — PROPOFOL 10 MG/ML
INJECTION, EMULSION INTRAVENOUS AS NEEDED
Status: DISCONTINUED | OUTPATIENT
Start: 2018-10-11 | End: 2018-10-11 | Stop reason: HOSPADM

## 2018-10-11 RX ORDER — ACETAMINOPHEN 10 MG/ML
1000 INJECTION, SOLUTION INTRAVENOUS ONCE
Status: DISCONTINUED | OUTPATIENT
Start: 2018-10-11 | End: 2018-10-11 | Stop reason: HOSPADM

## 2018-10-11 RX ORDER — MAGNESIUM SULFATE 100 %
4 CRYSTALS MISCELLANEOUS AS NEEDED
Status: DISCONTINUED | OUTPATIENT
Start: 2018-10-11 | End: 2018-10-11 | Stop reason: HOSPADM

## 2018-10-11 RX ORDER — SODIUM CHLORIDE 9 MG/ML
250 INJECTION, SOLUTION INTRAVENOUS AS NEEDED
Status: DISCONTINUED | OUTPATIENT
Start: 2018-10-11 | End: 2018-10-11

## 2018-10-11 RX ORDER — LIDOCAINE HYDROCHLORIDE 20 MG/ML
INJECTION, SOLUTION EPIDURAL; INFILTRATION; INTRACAUDAL; PERINEURAL AS NEEDED
Status: DISCONTINUED | OUTPATIENT
Start: 2018-10-11 | End: 2018-10-11 | Stop reason: HOSPADM

## 2018-10-11 RX ORDER — SODIUM CHLORIDE 0.9 % (FLUSH) 0.9 %
5-10 SYRINGE (ML) INJECTION AS NEEDED
Status: DISCONTINUED | OUTPATIENT
Start: 2018-10-11 | End: 2018-10-11 | Stop reason: HOSPADM

## 2018-10-11 RX ADMIN — SODIUM CHLORIDE, SODIUM LACTATE, POTASSIUM CHLORIDE, CALCIUM CHLORIDE: 600; 310; 30; 20 INJECTION, SOLUTION INTRAVENOUS at 01:35

## 2018-10-11 RX ADMIN — VITAMIN A, VITAMIN C, VITAMIN D-3, VITAMIN E, VITAMIN B-1, VITAMIN B-2, NIACIN, VITAMIN B-6, CALCIUM, IRON, ZINC, COPPER 1 TABLET: 4000; 120; 400; 22; 1.84; 3; 20; 10; 1; 12; 200; 27; 25; 2 TABLET ORAL at 11:07

## 2018-10-11 RX ADMIN — PROPOFOL 180 MG: 10 INJECTION, EMULSION INTRAVENOUS at 02:01

## 2018-10-11 RX ADMIN — ONDANSETRON 4 MG: 2 INJECTION INTRAMUSCULAR; INTRAVENOUS at 01:56

## 2018-10-11 RX ADMIN — CLINDAMYCIN PHOSPHATE 900 MG: 900 INJECTION, SOLUTION INTRAVENOUS at 01:53

## 2018-10-11 RX ADMIN — IBUPROFEN 800 MG: 400 TABLET ORAL at 14:13

## 2018-10-11 RX ADMIN — FENTANYL CITRATE 50 MCG: 50 INJECTION, SOLUTION INTRAMUSCULAR; INTRAVENOUS at 02:01

## 2018-10-11 RX ADMIN — LIDOCAINE HYDROCHLORIDE 60 MG: 20 INJECTION, SOLUTION EPIDURAL; INFILTRATION; INTRACAUDAL; PERINEURAL at 02:01

## 2018-10-11 RX ADMIN — Medication 999 ML/HR: at 00:20

## 2018-10-11 RX ADMIN — CARBOPROST TROMETHAMINE 250 MCG: 250 INJECTION, SOLUTION INTRAMUSCULAR at 00:43

## 2018-10-11 RX ADMIN — IBUPROFEN 800 MG: 400 TABLET ORAL at 05:01

## 2018-10-11 RX ADMIN — GENTAMICIN SULFATE 120 MG: 60 INJECTION, SOLUTION INTRAVENOUS at 02:16

## 2018-10-11 RX ADMIN — FENTANYL CITRATE 50 MCG: 50 INJECTION, SOLUTION INTRAMUSCULAR; INTRAVENOUS at 02:15

## 2018-10-11 RX ADMIN — METOCLOPRAMIDE HYDROCHLORIDE 10 MG: 5 INJECTION INTRAMUSCULAR; INTRAVENOUS at 01:46

## 2018-10-11 RX ADMIN — SUCCINYLCHOLINE CHLORIDE 120 MG: 20 INJECTION INTRAMUSCULAR; INTRAVENOUS at 02:02

## 2018-10-11 RX ADMIN — IBUPROFEN 800 MG: 400 TABLET ORAL at 22:05

## 2018-10-11 RX ADMIN — ROCURONIUM BROMIDE 10 MG: 10 INJECTION, SOLUTION INTRAVENOUS at 02:01

## 2018-10-11 RX ADMIN — ONDANSETRON 4 MG: 2 INJECTION INTRAMUSCULAR; INTRAVENOUS at 00:49

## 2018-10-11 RX ADMIN — ACETAMINOPHEN 1000 MG: 10 INJECTION, SOLUTION INTRAVENOUS at 04:57

## 2018-10-11 RX ADMIN — ONDANSETRON 4 MG: 2 INJECTION INTRAMUSCULAR; INTRAVENOUS at 04:59

## 2018-10-11 RX ADMIN — DEXAMETHASONE SODIUM PHOSPHATE 4 MG: 4 INJECTION, SOLUTION INTRA-ARTICULAR; INTRALESIONAL; INTRAMUSCULAR; INTRAVENOUS; SOFT TISSUE at 01:46

## 2018-10-11 RX ADMIN — METHYLERGONOVINE MALEATE 0.2 MG: 0.2 INJECTION, SOLUTION INTRAMUSCULAR; INTRAVENOUS at 00:21

## 2018-10-11 RX ADMIN — SENNOSIDES AND DOCUSATE SODIUM 1 TABLET: 8.6; 5 TABLET ORAL at 22:23

## 2018-10-11 RX ADMIN — MIDAZOLAM HYDROCHLORIDE 2 MG: 1 INJECTION, SOLUTION INTRAMUSCULAR; INTRAVENOUS at 01:53

## 2018-10-11 NOTE — BRIEF OP NOTE
BRIEF OPERATIVE NOTE Date of Procedure: 10/11/2018 Preoperative Diagnosis: Post partum hemorrhage Postoperative Diagnosis: * No post-op diagnosis entered * Procedure(s): DILATATION AND CURETTAGE Surgeon(s) and Role: Charles Nolan MD - Primary Surgical Assistant: None Surgical Staff: 
Circ-1: Lindsey Kelly RN Scrub Tech-1: Raúl Rios Surg Asst-1: Willodean Pop Event Time In Incision Start 0210 Incision Close 0230 Anesthesia: General  
Estimated Blood Loss: 400 cc's Specimens: POC Findings: Enlarged uterus with large amount of retained POC. Complications: none Implants: * No implants in log *

## 2018-10-11 NOTE — PROGRESS NOTES
0961 Bedside and Verbal shift change report given to MIRNA Nicole Rn (oncoming nurse) by Elicia Bernardo (offgoing nurse). Report included the following information SBAR, Kardex, Procedure Summary, Intake/Output, MAR and Recent Results. 1230 TRANSFER - OUT REPORT: 
 
Verbal report given to DARLING Joe RN(name) on Rox Nix  being transferred to postpartum(unit) for routine progression of care Report consisted of patients Situation, Background, Assessment and  
Recommendations(SBAR). Information from the following report(s) SBAR, Kardex, Procedure Summary, Intake/Output, MAR and Recent Results was reviewed with the receiving nurse. Lines:  
Peripheral IV 10/09/18 (Active) Site Assessment Clean, dry, & intact 10/11/2018  7:30 AM  
Phlebitis Assessment 0 10/11/2018  7:30 AM  
Infiltration Assessment 0 10/11/2018  7:30 AM  
Dressing Status Clean, dry, & intact 10/11/2018  7:30 AM  
Dressing Type Tape;Transparent 10/11/2018  7:30 AM  
Hub Color/Line Status Infusing;Pink 10/11/2018  7:30 AM  
Alcohol Cap Used Yes 10/11/2018  7:30 AM  
  
 
Opportunity for questions and clarification was provided. Patient transported with: 
 Registered Nurse

## 2018-10-11 NOTE — LACTATION NOTE
Per mom, infant latching and nursing well. Infant currently latched and nursing well. Breastfeeding discharge teaching completed to include feeding on demand, foremilk and hindmilk importance, engorgement, mastitis, clogged ducts, pumping, breastmilk storage, and returning to work. Information given about breastfeeding support group and unit and office phone numbers provided and encouraged mom to reach out if concerns arise, but that [facility] would be calling her in the next few days to follow up on breastfeeding. Mom verbalized understanding and no questions at this time.

## 2018-10-11 NOTE — ANESTHESIA POSTPROCEDURE EVALUATION
Post-Anesthesia Evaluation and Assessment Cardiovascular Function/Vital Signs Visit Vitals  BP (!) 147/93  Pulse 81  Temp 36.2 °C (97.2 °F)  Resp 17  Ht 5' 5\" (1.651 m)  Wt 69.9 kg (154 lb)  SpO2 100%  Breastfeeding No  
 BMI 25.63 kg/m2 Patient is status post Procedure(s): DILATATION AND CURETTAGE, WITH SUCTION FOR EVACUATION OF PRODUCTS OF CONCEPTION. Nausea/Vomiting: Controlled. Postoperative hydration reviewed and adequate. Pain: 
Pain Scale 1: FLACC (10/11/18 0336) Pain Intensity 1: 2 (10/11/18 0336) Managed. Neurological Status:  
Neuro (WDL): Within Defined Limits (10/09/18 2308) At baseline. Mental Status and Level of Consciousness: Arousable. Pulmonary Status:  
O2 Device: Room air (10/11/18 0336) Adequate oxygenation and airway patent. Complications related to anesthesia: None Post-anesthesia assessment completed. No concerns. Patient has met all discharge requirements. Signed By: Sujey Salgado CRNA October 11, 2018

## 2018-10-11 NOTE — PERIOP NOTES
Patient is resting quietly patent airway, parker pad with scant amount of bloody drainage. Patient has 2 pillows from home on foot of bed &  cell phone was brought over with patient from floor.

## 2018-10-11 NOTE — PERIOP NOTES
TRANSFER - IN REPORT: 
 
Verbal report received from EVE Alexander on [place] [employer]  being received from OR (unit) for routine post - op Report consisted of patients Situation, Background, Assessment and  
Recommendations(SBAR). Information from the following report(s) SBAR, Intake/Output and MAR was reviewed with the receiving nurse. Opportunity for questions and clarification was provided. Assessment completed upon patients arrival to unit and care assumed.

## 2018-10-11 NOTE — ROUTINE PROCESS
26:  TRANSFER - IN REPORT: 
 
Verbal report received from Vikki Riley RN (name) on Bhupinder Piper  being received from PACU (unit) for routine progression of care Report consisted of patients Situation, Background, Assessment and  
Recommendations(SBAR). Information from the following report(s) SBAR, MAR and Recent Results was reviewed with the receiving nurse. Opportunity for questions and clarification was provided. Assessment completed upon patients arrival to unit and care assumed. 0425:  Patient transferred via stretcher to [facility]. This RN assumed care of patient. Rika care, bed pad changed, gown changed, rika pad changed, additional warm blanket provided, ice water, crackers, menu for ordering breakfast, call bell within reach. Patient reports some mild cramping, no other needs at this time. 0726:  Patient on bedpan per request. 
 
5885:  Telephone report given to DARLING Walker including procedure, blood pressures. Order received to add [address] labs to 0900 labs and to discontinue Solorio at this time. Bedside and Verbal shift change report given to (oncoming nurse) by Steff Watt RN 
 (offgoing nurse). Report included the following information SBAR, MAR and Recent Results. Alarm parameters reviewed and opportunities for questions provided.

## 2018-10-11 NOTE — OP NOTES
[address] REPORT    Iker Peralta  MR#: [id number]  : 1985  ACCOUNT #: [de-identified]   DATE OF SERVICE: 10/11/2018    PREOPERATIVE DIAGNOSIS:  Postpartum hemorrhage, retained products of conception. POSTOPERATIVE DIAGNOSIS:  Postpartum hemorrhage, retained products of conception. PROCEDURE PERFORMED:  Dilatation and curettage with suction. SURGEON:  Maik Álvarez MD    ASSISTANT:  None. ANESTHESIA:  General, Dr. Shayy Acosta. ESTIMATED BLOOD LOSS:  400 mL. SPECIMENS REMOVED:  Retained products of conception. FINDINGS:  Enlarged tender uterus with palpable tissue felt in the os. There were no palpable adnexal masses. There was a large amount of tissue obtained at dilatation and curettage. COMPLICATIONS:  None. IMPLANTS:  None. DESCRIPTION OF PROCEDURE:  The patient was brought to the operating room and identified as the correct patient. She received intravenous clindamycin and gentamicin on  the way to the operating room. She was placed in the supine position. Sequential compression boots were placed on her legs and activated. General anesthesia was administered without difficulty. A Solorio catheter had already been placed and this was draining clear urine. The patient was placed in the dorsal lithotomy position. She was sterilely prepped and draped in the usual manner. A timeout was performed. Examination under anesthesia revealed the uterus to be approximately 20 weeks' size with clot felt in the cervical opening. The cervix was open approximately 2-3 cm. A weighted speculum was placed in the vagina. Bean retractor was placed anteriorly. The anterior lip of the cervix was grasped with a double tooth tenaculum. Using the #14 suction cannula, this was introduced into the uterine cavity. The cavity was  Suctioned,  productive of a large amount of tissue.   The uterus was sharply curetted with a large horseshoe curette, suctioned and curetted several more times until no further tissue was obtained. The double tooth tenaculum rather was removed from the cervix and there was noted to be a small amount of bleeding from the anterior lip. This was suture ligated with a figure-of-eight suture of 2-0 Vicryl. All instruments were removed from the vagina. There was good hemostasis. The patient was wiped clean and replaced in the supine position. All sharp, sponge and instrument counts were correct. She was awakened from general anesthesia and taken to the recovery room in stable condition.       MD Eldon Arellano  D: 10/11/2018 03:29     T: 10/11/2018 09:32  JOB #: [id number]

## 2018-10-11 NOTE — PERIOP NOTES
TRANSFER - OUT REPORT: 
 
Verbal report given to ANH Chen RN on [employer]  being transferred to L & D (unit) for routine post - op Report consisted of patients Situation, Background, Assessment and  
Recommendations(SBAR). Information from the following report(s) SBAR, Procedure Summary and Intake/Output was reviewed with the receiving nurse. Lines:  
Peripheral IV 10/09/18 (Active) Site Assessment Clean, dry, & intact 10/11/2018  3:17 AM  
Phlebitis Assessment 0 10/11/2018  3:17 AM  
Infiltration Assessment 0 10/11/2018  3:17 AM  
Dressing Status Clean, dry, & intact 10/11/2018  3:17 AM  
Dressing Type Tape;Transparent 10/11/2018  3:17 AM  
Hub Color/Line Status Infusing 10/11/2018  3:17 AM  
Alcohol Cap Used Yes 10/9/2018  7:42 PM  
  
 
Opportunity for questions and clarification was provided. Patient transported with: 
 Registered Nurse Reviewed SBAR 
patient received 2 units PRBC'S in pacu Intake/Output Summary (Last 24 hours) at 10/11/18 0347 Last data filed at 10/11/18 8918 Gross per 24 hour Intake              600 ml Output              900 ml Net             -300 ml

## 2018-10-11 NOTE — PROGRESS NOTES
Bedside and Verbal shift change report given to Jillian Velasco RN (oncoming nurse) by Ana Maria Lombardo RN (offgoing nurse). Report included the following information SBAR, Kardex, Intake/Output, MAR and Recent Results.

## 2018-10-11 NOTE — PROGRESS NOTES
2230 Entered pts room, patient in bathroom concerned about a gulf ball sized clot. Assessed patients fundus, fundus firm at umbilicus plus 1, small amount of bleeding. Informed patient to notify nurse if incident reoccurs. Notified patients primary nurse. [facility] Pt called informing nurse of another golf sized clot, pt also indicated she felt dizzy when standing. Notified L&D nurse Jud Reyna RN to come to assess patient. [phone] paged Dr. Helen Valle 46 Dr. Nestor Tyler proptly returned page. Notified of pt bleeding,to include clots EBL and vital signs. To recall Dr. Helen Valle with results

## 2018-10-11 NOTE — PROGRESS NOTES
Progress Note Patient: Margy Zendejas MRN: [id number]  SSN: [id number] YOB: 1985  Age: 34 y.o. Sex: female Subjective:  
 
Postpartum Day: 2 Vaginal Delivery The patient is without complaints. The patient is ambulating well. The patient  tolerating a normal diet. The baby is well. She is S/P postpartum D&C for retained placenta. Objective:  
  
Patient Vitals for the past 8 hrs: 
 BP Temp Pulse Resp SpO2  
10/11/18 0730 133/78 98.5 °F (36.9 °C) 63 16 -  
10/11/18 0724 - - - - 97 % 10/11/18 0719 - - - - 100 % 10/11/18 0714 - - - - 99 % 10/11/18 0709 - - - - 99 % 10/11/18 0704 - - - - 99 % 10/11/18 0700 134/85 - 65 - -  
10/11/18 0659 - - - - 98 % 10/11/18 0655 - - - - 98 % 10/11/18 0650 - - - - 99 % 10/11/18 0645 - - - - 98 % 10/11/18 0640 - - - - 97 % 10/11/18 0635 - - - - 99 % 10/11/18 0630 130/79 - 70 - 99 % 10/11/18 0625 - - - - 98 % 10/11/18 0620 - - - - 100 % 10/11/18 0615 - - - - 97 % 10/11/18 0610 - - - - 99 % 10/11/18 0605 - - - - 98 % 10/11/18 0600 (!) 142/91 - 73 - 98 % 10/11/18 0555 - - - - 99 % 10/11/18 0554 148/86 - 71 - -  
10/11/18 0550 - - - - 100 % 10/11/18 0545 - - 72 - 100 % 10/11/18 0540 - - - - 100 % 10/11/18 0537 - - - - 100 % 10/11/18 0532 - - - - 100 % 10/11/18 0530 - - 71 - -  
10/11/18 0527 - - - - 100 % 10/11/18 0522 - - - - 100 % 10/11/18 0517 - - - - 100 % 10/11/18 0515 137/86 - 70 - -  
10/11/18 0512 - - - - 100 % 10/11/18 0507 - - - - 100 % 10/11/18 0502 - - - - 100 % 10/11/18 0500 (!) 154/96 97.9 °F (36.6 °C) 76 18 -  
10/11/18 0457 - - - - 100 % 10/11/18 0452 - - - - 100 % 10/11/18 0451 - - - - 100 % 10/11/18 0448 - - - - 99 % 10/11/18 0444 - - 79 - -  
10/11/18 0443 - - - - 100 % 10/11/18 0442 (!) 153/100 - 77 - -  
10/11/18 0412 - 97.2 °F (36.2 °C) - - -  
10/11/18 0410 - - 82 18 100 % 10/11/18 0408 - - 75 16 99 % 10/11/18 0405 - - 82 13 98 % 10/11/18 0404 - - 81 22 100 % 10/11/18 0400 (!) 157/95 - 81 (!) 7 100 % 10/11/18 0357 - - 81 19 100 % 10/11/18 0356 - - 78 10 99 % 10/11/18 0355 - - 77 8 100 % 10/11/18 0354 - - 76 11 100 % 10/11/18 0353 - - 85 18 100 % 10/11/18 0352 - - 80 9 99 % 10/11/18 0351 - - 79 9 99 % 10/11/18 0350 - - 82 19 99 % 10/11/18 0349 - - 83 15 100 % 10/11/18 0348 - - 81 11 99 % 10/11/18 0347 - - 83 11 100 % 10/11/18 0346 - - 83 11 100 % 10/11/18 0345 (!) 154/99 - 79 9 100 % 10/11/18 0342 - - 83 12 100 % 10/11/18 0341 - - 84 18 100 % 10/11/18 0339 - - 83 10 100 % 10/11/18 0338 - - 85 15 100 % 10/11/18 0337 - - 85 10 100 % 10/11/18 0336 (!) 147/93 97.2 °F (36.2 °C) 83 17 100 % 10/11/18 0335 (!) 151/94 - 82 13 100 % 10/11/18 0334 - - 92 14 100 % 10/11/18 0333 (!) 147/93 - 83 17 98 % 10/11/18 0332 - - 83 9 100 % 10/11/18 0331 - - 84 15 99 % 10/11/18 0330 - - 81 12 100 % 10/11/18 0329 - - 82 17 100 % 10/11/18 0328 - - 83 10 100 % 10/11/18 0327 - - 82 9 100 % 10/11/18 0326 - - 88 11 100 % 10/11/18 0325 - - 81 (!) 7 99 % 10/11/18 0324 - - 87 11 100 % 10/11/18 0320 (!) 150/93 - 84 14 100 % 10/11/18 0315 (!) 149/93 - 91 16 99 % 10/11/18 0310 (!) 144/95 - 95 12 100 % 10/11/18 0305 147/89 - 95 13 100 % 10/11/18 0300 (!) 150/95 - 97 (!) 6 100 % 10/11/18 0258 - 97.3 °F (36.3 °C) - - -  
10/11/18 0255 (!) 147/106 - 99 21 100 % 10/11/18 0250 (!) 150/98 - 97 17 100 % 10/11/18 0245 (!) 145/94 - 97 (!) 7 100 % 10/11/18 0241 (!) 141/96 96.8 °F (36 °C) 97 11 100 % 10/11/18 0240 (!) 147/96 - 94 16 100 % 10/11/18 0237 - 96.8 °F (36 °C) 97 (!) 6 100 % 10/11/18 0236 (!) 141/96 - - - -  
 
LABS: Recent Results (from the past 24 hour(s)) CBC W/O DIFF Collection Time: 10/11/18 12:34 AM  
Result Value Ref Range WBC 9.6 4.6 - 13.2 K/uL  
 RBC 3.06 (L) 4.20 - 5.30 M/uL HGB 6.6 (L) 12.0 - 16.0 g/dL HCT 20.9 (L) 35.0 - 45.0 %  MCV 68.3 (L) 74.0 - 97.0 FL  
 MCH 21.6 (L) 24.0 - 34.0 PG  
 MCHC 31.6 31.0 - 37.0 g/dL  
 RDW 16.6 (H) 11.6 - 14.5 % PLATELET 438 360 - 948 K/uL MPV 9.9 9.2 - 11.8 FL  
CBC WITH AUTOMATED DIFF Collection Time: 10/11/18  9:11 AM  
Result Value Ref Range WBC 13.8 (H) 4.6 - 13.2 K/uL  
 RBC 4.38 4.20 - 5.30 M/uL  
 HGB 10.2 (L) 12.0 - 16.0 g/dL HCT 31.5 (L) 35.0 - 45.0 % MCV 71.9 (L) 74.0 - 97.0 FL  
 MCH 23.3 (L) 24.0 - 34.0 PG  
 MCHC 32.4 31.0 - 37.0 g/dL  
 RDW 18.7 (H) 11.6 - 14.5 % PLATELET 911 013 - 795 K/uL MPV 10.1 9.2 - 11.8 FL  
 NEUTROPHILS 92 (H) 40 - 73 % LYMPHOCYTES 6 (L) 21 - 52 % MONOCYTES 2 (L) 3 - 10 % EOSINOPHILS 0 0 - 5 % BASOPHILS 0 0 - 2 %  
 ABS. NEUTROPHILS 12.7 (H) 1.8 - 8.0 K/UL  
 ABS. LYMPHOCYTES 0.8 (L) 0.9 - 3.6 K/UL  
 ABS. MONOCYTES 0.3 0.05 - 1.2 K/UL  
 ABS. EOSINOPHILS 0.0 0.0 - 0.4 K/UL  
 ABS. BASOPHILS 0.0 0.0 - 0.1 K/UL  
 DF AUTOMATED Lab Results Component Value Date/Time HGB 10.2 (L) 10/11/2018 09:11 AM  
 
Lab Results Component Value Date/Time HCT 31.5 (L) 10/11/2018 09:11 AM  
  
Lochia:  appropriate Uterine Fundus:   firm, -1 Lab/Data Review: All lab results for the last 24 hours reviewed. Assessment:  
 
Status post: Doing well postpartum vaginal delivery day 2 S/P D&C this AM for retained placenta. Plan:  
 
Continue day 2 post-vaginal delivery orders. Postpartum care discussed including diet, ambulation, and actvitiy restrictions. Discharge home tolorrow Signed By: Domingo Alves CNM October 11, 2018

## 2018-10-11 NOTE — PROGRESS NOTES
Post-Partum Day Number 2 Progress Note Renata Cifuentes Assessment:  
Hospital Problems  Never Reviewed Codes Class Noted POA Decreased fetal movement ICD-10-CM: O36.8190 ICD-9-CM: 655.70  10/9/2018 Unknown Oligohydramnios ICD-10-CM: O41.00X0 ICD-9-CM: 658.00  10/9/2018 Unknown  
   
 IUP (intrauterine pregnancy), incidental ICD-10-CM: Z34.90 ICD-9-CM: V22.2  10/9/2018 Unknown Information for the patient's :  Annette Mckeon [[id number]] Vaginal, Spontaneous Delivery Current Facility-Administered Medications Medication Dose Route Frequency  diph,Pertuss(AC),Tet Vac-PF (BOOSTRIX) suspension 0.5 mL  0.5 mL IntraMUSCular PRIOR TO DISCHARGE  oxytocin (PITOCIN) 20 units/1000 ml LR  999 mL/hr IntraVENous ONCE  
 oxytocin (PITOCIN) 20 units/1000 ml LR  125 mL/hr IntraVENous CONTINUOUS  
 oxytocin (PITOCIN) 30 units/500 ml NS  0-20 marie-units/min IntraVENous TITRATE  ibuprofen (MOTRIN) tablet 800 mg  800 mg Oral Q8H Vitals: 
Visit Vitals  BP (!) 150/93  Pulse 80  Temp 98.5 °F (36.9 °C)  Resp 18  Ht 5' 5\" (1.651 m)  Wt 154 lb (69.9 kg)  SpO2 99%  Breastfeeding No  
 BMI 25.63 kg/m2 Temp (24hrs), Av.3 °F (36.8 °C), Min:98.1 °F (36.7 °C), Max:98.5 °F (36.9 °C) Exam:    
    Patient without distress. Abdomen soft, fundus firm, tender, 
Pelvic:  Approximately 200 cc clot expressed from vagina. Cervix is open and either clot or tissue (placenta) felt in os. No active hemorrhaging noted. Lower extremities are negative for swelling, cords or tenderness. Labs:  
 
Lab Results Component Value Date/Time  WBC 9.6 10/11/2018 12:34 AM  
 WBC 8.0 10/09/2018 04:45 PM  
 HGB 6.6 (L) 10/11/2018 12:34 AM  
 HGB 7.7 (L) 10/10/2018 05:30 AM  
 HGB 9.0 (L) 10/09/2018 04:45 PM  
 HCT 20.9 (L) 10/11/2018 12:34 AM  
 HCT 25.1 (L) 10/10/2018 05:30 AM  
 HCT 29.0 (L) 10/09/2018 04:45 PM  
 PLATELET 941 93/72/3902 12:34 AM  
 PLATELET 380 93/12/5425 04:45 PM  
 
 
Recent Results (from the past 24 hour(s)) HEMOGLOBIN Collection Time: 10/10/18  5:30 AM  
Result Value Ref Range HGB 7.7 (L) 12.0 - 16.0 g/dL HEMATOCRIT Collection Time: 10/10/18  5:30 AM  
Result Value Ref Range HCT 25.1 (L) 35.0 - 45.0 % CBC W/O DIFF Collection Time: 10/11/18 12:34 AM  
Result Value Ref Range WBC 9.6 4.6 - 13.2 K/uL  
 RBC 3.06 (L) 4.20 - 5.30 M/uL HGB 6.6 (L) 12.0 - 16.0 g/dL HCT 20.9 (L) 35.0 - 45.0 % MCV 68.3 (L) 74.0 - 97.0 FL  
 MCH 21.6 (L) 24.0 - 34.0 PG  
 MCHC 31.6 31.0 - 37.0 g/dL  
 RDW 16.6 (H) 11.6 - 14.5 % PLATELET 512 502 - 030 K/uL MPV 9.9 9.2 - 11.8 FL  
 
 
A/P:  Possible retained products of conception with significant anemia, passage of clots. Blood has been ordered. Will proceed with D and C. Risks explained to patient and family, consent signed and anesthesia notified.

## 2018-10-11 NOTE — PROGRESS NOTES
0710 Bedside and Verbal shift change report given to Saira Streeter RN (oncoming nurse) by Katherin Robins RN (offgoing nurse). Report included the following information SBAR, Kardex, Intake/Output and MAR.  
 
0715 patient instructed to order breakfast and call RN for assistance to restroom when needed. Patient verbalizes understanding. Denies needs. 1923 lab at bedside 
 
0915 patient assisted up to restroom with minimal assistance. pericare performed, pads and mesh underwear applied. 0940 Bedside and Verbal shift change report given to MIRNA Nicole RN (oncoming nurse) by Saira Streeter RN (offgoing nurse). Report included the following information SBAR, Kardex, Intake/Output and MAR.

## 2018-10-12 VITALS
HEIGHT: 65 IN | BODY MASS INDEX: 25.66 KG/M2 | TEMPERATURE: 98.5 F | HEART RATE: 80 BPM | SYSTOLIC BLOOD PRESSURE: 129 MMHG | WEIGHT: 154 LBS | OXYGEN SATURATION: 100 % | RESPIRATION RATE: 18 BRPM | DIASTOLIC BLOOD PRESSURE: 87 MMHG

## 2018-10-12 LAB
ABO + RH BLD: NORMAL
BLD PROD TYP BPU: NORMAL
BLD PROD TYP BPU: NORMAL
BLOOD GROUP ANTIBODIES SERPL: NORMAL
BPU ID: NORMAL
BPU ID: NORMAL
CALLED TO:,BCALL1: NORMAL
CROSSMATCH RESULT,%XM: NORMAL
CROSSMATCH RESULT,%XM: NORMAL
SPECIMEN EXP DATE BLD: NORMAL
STATUS OF UNIT,%ST: NORMAL
STATUS OF UNIT,%ST: NORMAL
UNIT DIVISION, %UDIV: 0
UNIT DIVISION, %UDIV: 0

## 2018-10-12 PROCEDURE — 74011250637 HC RX REV CODE- 250/637: Performed by: OBSTETRICS & GYNECOLOGY

## 2018-10-12 PROCEDURE — 74011250637 HC RX REV CODE- 250/637: Performed by: ADVANCED PRACTICE MIDWIFE

## 2018-10-12 RX ORDER — IBUPROFEN 800 MG/1
800 TABLET ORAL EVERY 8 HOURS
Qty: 60 TAB | Refills: 0 | Status: SHIPPED | OUTPATIENT
Start: 2018-10-12

## 2018-10-12 RX ORDER — DOCUSATE SODIUM 100 MG/1
100 CAPSULE, LIQUID FILLED ORAL
Qty: 60 CAP | Refills: 2 | Status: SHIPPED | OUTPATIENT
Start: 2018-10-12 | End: 2019-01-10

## 2018-10-12 RX ORDER — FERROUS SULFATE 325(65) MG
325 TABLET, DELAYED RELEASE (ENTERIC COATED) ORAL
Qty: 60 TAB | Refills: 2 | Status: SHIPPED | OUTPATIENT
Start: 2018-10-12 | End: 2019-01-10

## 2018-10-12 RX ADMIN — IBUPROFEN 800 MG: 400 TABLET ORAL at 10:38

## 2018-10-12 RX ADMIN — VITAMIN A, VITAMIN C, VITAMIN D-3, VITAMIN E, VITAMIN B-1, VITAMIN B-2, NIACIN, VITAMIN B-6, CALCIUM, IRON, ZINC, COPPER 1 TABLET: 4000; 120; 400; 22; 1.84; 3; 20; 10; 1; 12; 200; 27; 25; 2 TABLET ORAL at 10:37

## 2018-10-12 RX ADMIN — SENNOSIDES AND DOCUSATE SODIUM 1 TABLET: 8.6; 5 TABLET ORAL at 10:37

## 2018-10-12 NOTE — PROGRESS NOTES
BEDSIDE_VERBAL_RECORDED_WRITTEN: shift change report given to DALILA miranda rn (oncoming nurse) by keyshawn Cramer (offgoing nurse). Report given with Dannielle HIGHTOWER and MAR.

## 2018-10-12 NOTE — ANESTHESIA PREPROCEDURE EVALUATION
Anesthetic History Review of Systems / Medical History Patient summary reviewed, nursing notes reviewed and pertinent labs reviewed Pulmonary Within defined limits Neuro/Psych Within defined limits Cardiovascular Exercise tolerance: >4 METS 
  
GI/Hepatic/Renal 
Within defined limits Endo/Other Within defined limits Other Findings Physical Exam 
 
Airway Mallampati: II 
TM Distance: 4 - 6 cm Neck ROM: normal range of motion Mouth opening: Normal 
 
 Cardiovascular Regular rate and rhythm,  S1 and S2 normal,  no murmur, click, rub, or gallop Dental 
No notable dental hx Pulmonary Breath sounds clear to auscultation Abdominal 
GI exam deferred Other Findings Anesthetic Plan ASA: 2, emergent Anesthesia type: general 
 
 
 
 
Induction: Intravenous Anesthetic plan and risks discussed with: Patient

## 2018-10-12 NOTE — PROGRESS NOTES
Bedside and Verbal shift change report given to ANNIE Go RN  (oncoming nurse) by MATILDE Allen RN  (offgoing nurse). Report included the following information SBAR, Kardex, Procedure Summary, Intake/Output, MAR, Accordion, Recent Results and Med Rec Status.

## 2018-10-12 NOTE — PROGRESS NOTES
Post-Partum Day Number 3 Progress Note Canary Dural Assessment:  
Hospital Problems  Date Reviewed: 10/12/2018 Codes Class Noted POA Postpartum hemorrhage, delayed (> 24 hrs) ICD-10-CM: O72.2 ICD-9-CM: 666.20  10/11/2018 No  
   
 S/P dilation and curettage ICD-10-CM: Z98.890 ICD-9-CM: V45.89  10/11/2018 No  
   
 Spontaneous vaginal delivery ICD-10-CM: O80 
ICD-9-CM: 218  10/11/2018 No  
   
 Anemia ICD-10-CM: D64.9 ICD-9-CM: 285.9  10/11/2018 Yes Post partum day 3  , s/p postpartum D&C and blood transfusion x 2 units PRBCs. Plan: 1. Status improved. Hgb 10.2. Asymptomatic for si/s of anemia. Will DC on PO iron BID daily. 1. Discharge home today 2. Follow up in office in 6 weeks with Eli Min MD 
3. Post partum activity advised, diet as tolerated 4. Discharge Medications: ibuprofen, iron, colace and medications prior to admission Information for the patient's :  Angie Mancini [[id number]] Vaginal, Spontaneous Delivery Patient doing well without significant complaint. Voiding without difficulty, normal lochia. Current Facility-Administered Medications Medication Dose Route Frequency  prenatal vit-calcium-iron-fa (PRENATAL PLUS with CALCIUM) tablet 1 Tab  1 Tab Oral DAILY  diph,Pertuss(AC),Tet Vac-PF (BOOSTRIX) suspension 0.5 mL  0.5 mL IntraMUSCular PRIOR TO DISCHARGE  oxytocin (PITOCIN) 20 units/1000 ml LR  125 mL/hr IntraVENous CONTINUOUS  ibuprofen (MOTRIN) tablet 800 mg  800 mg Oral Q8H Vitals: 
Visit Vitals  /87 (BP 1 Location: Left arm, BP Patient Position: At rest)  Pulse 80  Temp 98.5 °F (36.9 °C)  Resp 18  Ht 5' 5\" (1.651 m)  Wt 69.9 kg (154 lb)  SpO2 100%  Breastfeeding Unknown  BMI 25.63 kg/m2 Temp (24hrs), Av.4 °F (36.9 °C), Min:97.7 °F (36.5 °C), Max:98.7 °F (37.1 °C) Exam:   
  Patient without distress. Abdomen soft, fundus firm, nontender Lower extremities are negative for swelling, cords or tenderness. Labs:  
Lab Results Component Value Date/Time  WBC 13.8 (H) 10/11/2018 09:11 AM  
 WBC 9.6 10/11/2018 12:34 AM  
 WBC 8.0 10/09/2018 04:45 PM  
 HGB 10.2 (L) 10/11/2018 09:11 AM  
 HGB 6.6 (L) 10/11/2018 12:34 AM  
 HGB 7.7 (L) 10/10/2018 05:30 AM  
 HCT 31.5 (L) 10/11/2018 09:11 AM  
 HCT 20.9 (L) 10/11/2018 12:34 AM  
 HCT 25.1 (L) 10/10/2018 05:30 AM  
 PLATELET 422 60/83/9402 09:11 AM  
 PLATELET 392 65/75/3686 12:34 AM  
 PLATELET 102 60/74/0034 04:45 PM

## 2018-10-12 NOTE — DISCHARGE SUMMARY
Obstetrical Discharge Summary     Name: Marisa Ware MRN: [id number]  SSN: [id number]    YOB: 1985  Age: 34 y.o. Sex: female      Admit Date: 10/9/2018    Discharge Date: 10/12/2018    Admitting Physician: Yesenia Painting MD     Attending Physician:  Yesenia Painting MD     Discharge Diagnoses:   Information for the patient's :  Nurys Quiñonez [[id number]]   Delivery of a 3.23 kg male infant via Vaginal, Spontaneous Delivery on 10/9/2018 at 10:19 PM  by . Apgars were 8 and 9. Additional Diagnoses:   Problem List as of 10/12/2018  Date Reviewed: 10/12/2018          Codes Class Noted - Resolved    Postpartum hemorrhage, delayed (> 24 hrs) ICD-10-CM: O72.2  ICD-9-CM: 666.20  10/11/2018 - Present        S/P dilation and curettage ICD-10-CM: Z98.890  ICD-9-CM: V45.89  10/11/2018 - Present        Spontaneous vaginal delivery ICD-10-CM: O80  ICD-9-CM: 650  10/11/2018 - Present        Anemia ICD-10-CM: D64.9  ICD-9-CM: 285.9  10/11/2018 - Present        RESOLVED: Decreased fetal movement ICD-10-CM: I65.4416  ICD-9-CM: 655.70  10/9/2018 - 10/11/2018        RESOLVED: Oligohydramnios ICD-10-CM: O41.00X0  ICD-9-CM: 658.00  10/9/2018 - 10/11/2018        RESOLVED: IUP (intrauterine pregnancy), incidental ICD-10-CM: Z34.90  ICD-9-CM: V22.2  10/9/2018 - 10/11/2018              Lab Results   Component Value Date/Time    Rubella, External immune 2018    GrBStrep, External negative 2018     Recent Labs      10/11/18   0911   HGB  10.2*       Hospital Course: Postpartum course was complicated by postpartum hemorrhage, which added 1 days to the patient's length of stay. Patient Instructions:   Current Discharge Medication List      START taking these medications    Details   ibuprofen (MOTRIN) 800 mg tablet Take 1 Tab by mouth every eight (8) hours.  Indications: Pain  Qty: 60 Tab, Refills: 0      docusate sodium (COLACE) 100 mg capsule Take 1 Cap by mouth two (2) times daily as needed for Constipation for up to 90 days. Qty: 60 Cap, Refills: 2      ferrous sulfate (IRON) 325 mg (65 mg iron) EC tablet Take 1 Tab by mouth Before breakfast and dinner for 90 days. Qty: 60 Tab, Refills: 2         CONTINUE these medications which have NOT CHANGED    Details   prenatal vit calc,iron,folic (PRENATAL #2 PO) Take 1 Tab by mouth daily. raNITIdine (ZANTAC) 150 mg tablet Take 150 mg by mouth two (2) times a day. Reference my discharge instructions. Follow-up Appointments   Procedures    FOLLOW UP VISIT Appointment in: 6 Weeks 1. Patient is to follow-up for postpartum visit with Dr. Eliane Cornelius in 6 weeks     1.  Patient is to follow-up for postpartum visit with Dr. Eliane Cornelius in 6 weeks     Standing Status:   Standing     Number of Occurrences:   1     Order Specific Question:   Appointment in     Answer:   6 Weeks        Signed By:  Alba Black CNM     October 12, 2018                       BST

## 2018-10-12 NOTE — PROGRESS NOTES
Discharge instructions given to pt for selfcare and  and reviewed with Alisha Walsh with parents verbal understanding.

## 2018-10-12 NOTE — DISCHARGE INSTRUCTIONS
POST DELIVERY DISCHARGE INSTRUCTIONS    Name: Deandre Douglas  YOB: 1985  Primary Diagnosis: Active Problems:    Postpartum hemorrhage, delayed (> 24 hrs) (10/11/2018)      S/P dilation and curettage (10/11/2018)      Spontaneous vaginal delivery (10/11/2018)      Anemia (10/11/2018)        General:     Diet/Diet Restrictions:  Eight 8-ounce glasses of fluid daily (water, juices); avoid excessive caffeine intake. Meals/snacks as desired which are high in fiber and carbohydrates and low in fat and cholesterol. Physical Activity / Restrictions / Safety:     Avoid heavy lifting, no more than the baby alone (not the baby in the car seat). Avoid intercourse until you are seen at your postpartum visit. No douching or tampon use. Check with obstetrician before starting or resuming an exercise program.         Discharge Instructions/Special Treatment/Home Care Needs:     Continue prenatal vitamins. Continue to use squirt bottle with warm water on your perineum after each bathroom use until bleeding stops. Call your doctor for the following:     Fever over 101 degrees by mouth. Vaginal bleeding that soaks two pads per hour for more than one hour. Red streaks or increased swelling of legs, painful red streaks on your breast.  If you feel extremely anxious or overwhelmed. If you have thoughts of harming yourself and/or your baby. Pain Management:     Pain Management:   Take Acetaminophen (Tylenol) or Ibuprofen (Advil, Motrin), as directed for pain. Use a warm Sitz bath 3 times daily to relieve perineal or hemorrhoidal discomfort. For hemorrhoidal discomfort, use Tucks and Anusol cream as needed and directed. Follow-Up Care:     Appointment with MD:   Follow-up Appointments   Procedures    FOLLOW UP VISIT Appointment in: 6 Weeks 1. Patient is to follow-up for postpartum visit with Dr. Santiago Mcarthur in 6 weeks     1.  Patient is to follow-up for postpartum visit with Dr. Santiago Mcarthur in 6 weeks     Standing Status:   Standing     Number of Occurrences:   1     Order Specific Question:   Appointment in     Answer:   [address] Floor number: [phone]      Signed By: Bailee Dewey CNM                                                                                                   [facility] Activation    Thank you for requesting access to [facility]. Please follow the instructions below to securely access and download your online medical record. [facility] allows you to send messages to your doctor, view your test results, renew your prescriptions, schedule appointments, and more. How Do I Sign Up? 1. In your internet browser, go to [email or web address]  2. Click on the First Time User? Click Here link in the Sign In box. You will be redirect to the New Member Sign Up page. 3. Enter your [facility] Access Code exactly as it appears below. You will not need to use this code after youve completed the sign-up process. If you do not sign up before the expiration date, you must request a new code. [facility] Access Code: [email or web address]  Expires: 1/10/2019 11:37 AM (This is the date your [facility] access code will )    4. Enter the last four digits of your Social Security Number (xxxx) and Date of Birth (mm/dd/yyyy) as indicated and click Submit. You will be taken to the next sign-up page. 5. Create a [facility] ID. This will be your [facility] login ID and cannot be changed, so think of one that is secure and easy to remember. 6. Create a [facility] password. You can change your password at any time. 7. Enter your Password Reset Question and Answer. This can be used at a later time if you forget your password. 8. Enter your e-mail address. You will receive e-mail notification when new information is available in [address] 9. Click Sign Up. You can now view and download portions of your medical record.   10. Click the Download Summary menu link to download a portable copy of your medical information. Additional Information    If you have questions, please visit the Frequently Asked Questions section of the [employer] website at [email or web address] Remember, [facility] is NOT to be used for urgent needs. For medical emergencies, dial 911.     Patient armband removed and shredded

## 2018-10-12 NOTE — LACTATION NOTE
Reviewed with parents potential issues with tight frenulum. Encouraged to attend [facility] BF support group prn.

## (undated) DEVICE — SUT VCRL + 2-0 36IN CT1 UD --

## (undated) DEVICE — GOWN,AURORA,NONRNF,XL,30/CS: Brand: MEDLINE

## (undated) DEVICE — 3L THIN WALL CAN: Brand: CRD

## (undated) DEVICE — SOL IRRIGATION INJ NACL 0.9% 500ML BTL

## (undated) DEVICE — PAD,NON-ADHERENT,3X8,STERILE,LF,1/PK: Brand: MEDLINE

## (undated) DEVICE — DRAPE TWL SURG 16X26IN BLU ORB04] ALLCARE INC]

## (undated) DEVICE — KENDALL SCD EXPRESS SLEEVES, KNEE LENGTH, MEDIUM: Brand: KENDALL SCD

## (undated) DEVICE — TRAY PREP DRY W/ PREM GLV 2 APPL 6 SPNG 2 UNDPD 1 OVERWRAP

## (undated) DEVICE — D&C HYSTEROSCOPY: Brand: MEDLINE INDUSTRIES, INC.

## (undated) DEVICE — TUBE SUC UTER ASPIR 3/8INX6FT --

## (undated) DEVICE — DEVON™ KNEE AND BODY STRAP 60" X 3" (1.5 M X 7.6 CM): Brand: DEVON

## (undated) DEVICE — LIGHT HANDLE: Brand: DEVON

## (undated) DEVICE — CURETTE UTER VAC STR RIG 14MM --